# Patient Record
Sex: FEMALE | URBAN - METROPOLITAN AREA
[De-identification: names, ages, dates, MRNs, and addresses within clinical notes are randomized per-mention and may not be internally consistent; named-entity substitution may affect disease eponyms.]

---

## 2019-01-01 ENCOUNTER — TELEPHONE (OUTPATIENT)
Dept: NEUROLOGY | Facility: CLINIC | Age: 59
End: 2019-01-01

## 2019-01-01 ENCOUNTER — DOCUMENTATION ONLY (OUTPATIENT)
Dept: NEUROLOGY | Facility: CLINIC | Age: 59
End: 2019-01-01

## 2019-01-01 ENCOUNTER — OFFICE VISIT (OUTPATIENT)
Dept: NEUROLOGY | Facility: CLINIC | Age: 59
End: 2019-01-01
Payer: COMMERCIAL

## 2019-01-01 ENCOUNTER — HOSPITAL ENCOUNTER (OUTPATIENT)
Facility: CLINIC | Age: 59
End: 2019-01-01
Attending: PSYCHIATRY & NEUROLOGY | Admitting: PSYCHIATRY & NEUROLOGY
Payer: COMMERCIAL

## 2019-01-01 ENCOUNTER — THERAPY VISIT (OUTPATIENT)
Dept: SPEECH THERAPY | Facility: CLINIC | Age: 59
End: 2019-01-01
Payer: COMMERCIAL

## 2019-01-01 ENCOUNTER — OFFICE VISIT (OUTPATIENT)
Dept: PHYSICAL MEDICINE AND REHAB | Facility: CLINIC | Age: 59
End: 2019-01-01
Payer: COMMERCIAL

## 2019-01-01 ENCOUNTER — ALLIED HEALTH/NURSE VISIT (OUTPATIENT)
Dept: NUTRITION | Facility: CLINIC | Age: 59
End: 2019-01-01

## 2019-01-01 ENCOUNTER — PRE VISIT (OUTPATIENT)
Dept: ORTHOPEDICS | Facility: CLINIC | Age: 59
End: 2019-01-01

## 2019-01-01 ENCOUNTER — NURSE TRIAGE (OUTPATIENT)
Dept: NURSING | Facility: CLINIC | Age: 59
End: 2019-01-01

## 2019-01-01 ENCOUNTER — THERAPY VISIT (OUTPATIENT)
Dept: PHYSICAL THERAPY | Facility: CLINIC | Age: 59
End: 2019-01-01
Payer: COMMERCIAL

## 2019-01-01 ENCOUNTER — HOSPITAL ENCOUNTER (INPATIENT)
Facility: CLINIC | Age: 59
LOS: 1 days | Discharge: HOME | End: 2019-05-01
Attending: PSYCHIATRY & NEUROLOGY | Admitting: PSYCHIATRY & NEUROLOGY
Payer: COMMERCIAL

## 2019-01-01 ENCOUNTER — APPOINTMENT (OUTPATIENT)
Dept: INTERVENTIONAL RADIOLOGY/VASCULAR | Facility: CLINIC | Age: 59
End: 2019-01-01
Attending: PSYCHIATRY & NEUROLOGY
Payer: COMMERCIAL

## 2019-01-01 ENCOUNTER — PATIENT OUTREACH (OUTPATIENT)
Dept: CARE COORDINATION | Facility: CLINIC | Age: 59
End: 2019-01-01

## 2019-01-01 ENCOUNTER — MEDICAL CORRESPONDENCE (OUTPATIENT)
Dept: HEALTH INFORMATION MANAGEMENT | Facility: CLINIC | Age: 59
End: 2019-01-01

## 2019-01-01 ENCOUNTER — APPOINTMENT (OUTPATIENT)
Dept: SPEECH THERAPY | Facility: CLINIC | Age: 59
End: 2019-01-01
Attending: STUDENT IN AN ORGANIZED HEALTH CARE EDUCATION/TRAINING PROGRAM
Payer: COMMERCIAL

## 2019-01-01 ENCOUNTER — TELEPHONE (OUTPATIENT)
Dept: INTERVENTIONAL RADIOLOGY/VASCULAR | Facility: CLINIC | Age: 59
End: 2019-01-01

## 2019-01-01 ENCOUNTER — THERAPY VISIT (OUTPATIENT)
Dept: OCCUPATIONAL THERAPY | Facility: CLINIC | Age: 59
End: 2019-01-01
Payer: COMMERCIAL

## 2019-01-01 ENCOUNTER — ANCILLARY PROCEDURE (OUTPATIENT)
Dept: GENERAL RADIOLOGY | Facility: CLINIC | Age: 59
End: 2019-01-01
Payer: COMMERCIAL

## 2019-01-01 ENCOUNTER — TRANSFERRED RECORDS (OUTPATIENT)
Dept: HEALTH INFORMATION MANAGEMENT | Facility: CLINIC | Age: 59
End: 2019-01-01

## 2019-01-01 ENCOUNTER — OFFICE VISIT (OUTPATIENT)
Dept: ORTHOPEDICS | Facility: CLINIC | Age: 59
End: 2019-01-01
Attending: PSYCHIATRY & NEUROLOGY
Payer: COMMERCIAL

## 2019-01-01 ENCOUNTER — DOCUMENTATION ONLY (OUTPATIENT)
Dept: OTHER | Facility: CLINIC | Age: 59
End: 2019-01-01

## 2019-01-01 ENCOUNTER — TELEPHONE (OUTPATIENT)
Dept: NUTRITION | Facility: CLINIC | Age: 59
End: 2019-01-01

## 2019-01-01 ENCOUNTER — DOCUMENTATION ONLY (OUTPATIENT)
Dept: CARE COORDINATION | Facility: CLINIC | Age: 59
End: 2019-01-01

## 2019-01-01 ENCOUNTER — APPOINTMENT (OUTPATIENT)
Dept: EDUCATION SERVICES | Facility: CLINIC | Age: 59
End: 2019-01-01
Payer: COMMERCIAL

## 2019-01-01 ENCOUNTER — APPOINTMENT (OUTPATIENT)
Dept: MEDSURG UNIT | Facility: CLINIC | Age: 59
End: 2019-01-01
Attending: PSYCHIATRY & NEUROLOGY
Payer: COMMERCIAL

## 2019-01-01 ENCOUNTER — HOME INFUSION (PRE-WILLOW HOME INFUSION) (OUTPATIENT)
Dept: PHARMACY | Facility: CLINIC | Age: 59
End: 2019-01-01

## 2019-01-01 VITALS
TEMPERATURE: 97.5 F | WEIGHT: 117 LBS | RESPIRATION RATE: 15 BRPM | DIASTOLIC BLOOD PRESSURE: 84 MMHG | BODY MASS INDEX: 19.49 KG/M2 | HEIGHT: 65 IN | OXYGEN SATURATION: 98 % | HEART RATE: 89 BPM | SYSTOLIC BLOOD PRESSURE: 137 MMHG

## 2019-01-01 VITALS
HEART RATE: 95 BPM | DIASTOLIC BLOOD PRESSURE: 75 MMHG | WEIGHT: 122 LBS | SYSTOLIC BLOOD PRESSURE: 122 MMHG | RESPIRATION RATE: 15 BRPM | HEIGHT: 65 IN | OXYGEN SATURATION: 98 % | BODY MASS INDEX: 20.33 KG/M2

## 2019-01-01 VITALS
BODY MASS INDEX: 20.17 KG/M2 | OXYGEN SATURATION: 95 % | SYSTOLIC BLOOD PRESSURE: 133 MMHG | DIASTOLIC BLOOD PRESSURE: 83 MMHG | HEART RATE: 88 BPM | WEIGHT: 121.2 LBS

## 2019-01-01 VITALS
HEIGHT: 65 IN | WEIGHT: 119 LBS | HEART RATE: 88 BPM | RESPIRATION RATE: 16 BRPM | DIASTOLIC BLOOD PRESSURE: 62 MMHG | SYSTOLIC BLOOD PRESSURE: 112 MMHG | BODY MASS INDEX: 19.83 KG/M2 | TEMPERATURE: 96.4 F | OXYGEN SATURATION: 98 %

## 2019-01-01 VITALS
DIASTOLIC BLOOD PRESSURE: 84 MMHG | RESPIRATION RATE: 15 BRPM | SYSTOLIC BLOOD PRESSURE: 131 MMHG | HEART RATE: 83 BPM | TEMPERATURE: 97.4 F | HEIGHT: 65 IN | WEIGHT: 120 LBS | OXYGEN SATURATION: 96 % | BODY MASS INDEX: 19.99 KG/M2

## 2019-01-01 VITALS
HEART RATE: 72 BPM | DIASTOLIC BLOOD PRESSURE: 67 MMHG | RESPIRATION RATE: 16 BRPM | SYSTOLIC BLOOD PRESSURE: 99 MMHG | WEIGHT: 119.8 LBS | TEMPERATURE: 97.7 F | OXYGEN SATURATION: 100 %

## 2019-01-01 VITALS — HEART RATE: 86 BPM | SYSTOLIC BLOOD PRESSURE: 137 MMHG | RESPIRATION RATE: 16 BRPM | DIASTOLIC BLOOD PRESSURE: 83 MMHG

## 2019-01-01 DIAGNOSIS — G12.21 ALS (AMYOTROPHIC LATERAL SCLEROSIS) (H): ICD-10-CM

## 2019-01-01 DIAGNOSIS — G12.21 ALS (AMYOTROPHIC LATERAL SCLEROSIS) (H): Primary | ICD-10-CM

## 2019-01-01 DIAGNOSIS — R52 PAIN: ICD-10-CM

## 2019-01-01 DIAGNOSIS — G82.50 SPASTIC QUADRIPARESIS (H): Primary | ICD-10-CM

## 2019-01-01 DIAGNOSIS — M25.552 LEFT HIP PAIN: Primary | ICD-10-CM

## 2019-01-01 DIAGNOSIS — R13.12 OROPHARYNGEAL DYSPHAGIA: ICD-10-CM

## 2019-01-01 DIAGNOSIS — M25.562 ARTHRALGIA OF LEFT KNEE: Primary | ICD-10-CM

## 2019-01-01 DIAGNOSIS — Z74.09 IMPAIRED MOBILITY AND ADLS: ICD-10-CM

## 2019-01-01 DIAGNOSIS — Z74.09 IMPAIRED MOBILITY AND ADLS: Primary | ICD-10-CM

## 2019-01-01 DIAGNOSIS — F32.0 MILD MAJOR DEPRESSION (H): Primary | ICD-10-CM

## 2019-01-01 DIAGNOSIS — M25.552 LEFT HIP PAIN: ICD-10-CM

## 2019-01-01 DIAGNOSIS — Z78.9 IMPAIRED MOBILITY AND ADLS: Primary | ICD-10-CM

## 2019-01-01 DIAGNOSIS — I10 BENIGN ESSENTIAL HYPERTENSION: ICD-10-CM

## 2019-01-01 DIAGNOSIS — Z78.9 IMPAIRED MOBILITY AND ADLS: ICD-10-CM

## 2019-01-01 DIAGNOSIS — H04.123 DRY EYES: ICD-10-CM

## 2019-01-01 DIAGNOSIS — M62.838 MUSCLE SPASTICITY: ICD-10-CM

## 2019-01-01 DIAGNOSIS — Z74.1 REQUIRES DAILY ASSISTANCE FOR ACTIVITIES OF DAILY LIVING (ADL) AND COMFORT NEEDS: ICD-10-CM

## 2019-01-01 DIAGNOSIS — G82.50 SPASTIC QUADRIPARESIS (H): ICD-10-CM

## 2019-01-01 DIAGNOSIS — R25.2 SPASTICITY: ICD-10-CM

## 2019-01-01 DIAGNOSIS — R47.1 DYSARTHRIA AND ANARTHRIA: ICD-10-CM

## 2019-01-01 LAB
ALBUMIN SERPL-MCNC: 3 G/DL (ref 3.4–5)
ALBUMIN SERPL-MCNC: 3.7 G/DL (ref 3.4–5)
ALP SERPL-CCNC: 89 U/L (ref 40–150)
ALP SERPL-CCNC: 94 U/L (ref 40–150)
ALT SERPL W P-5'-P-CCNC: 20 U/L (ref 0–50)
ALT SERPL W P-5'-P-CCNC: 21 U/L (ref 0–50)
ANION GAP SERPL CALCULATED.3IONS-SCNC: 6 MMOL/L (ref 3–14)
ANION GAP SERPL CALCULATED.3IONS-SCNC: 6 MMOL/L (ref 3–14)
AST SERPL W P-5'-P-CCNC: 23 U/L (ref 0–45)
AST SERPL W P-5'-P-CCNC: 24 U/L (ref 0–45)
BILIRUB DIRECT SERPL-MCNC: 0.1 MG/DL (ref 0–0.2)
BILIRUB SERPL-MCNC: 0.3 MG/DL (ref 0.2–1.3)
BILIRUB SERPL-MCNC: 0.5 MG/DL (ref 0.2–1.3)
BUN SERPL-MCNC: 15 MG/DL (ref 7–30)
BUN SERPL-MCNC: 17 MG/DL (ref 7–30)
CALCIUM SERPL-MCNC: 8.5 MG/DL (ref 8.5–10.1)
CALCIUM SERPL-MCNC: 8.8 MG/DL (ref 8.5–10.1)
CHLORIDE SERPL-SCNC: 108 MMOL/L (ref 94–109)
CHLORIDE SERPL-SCNC: 111 MMOL/L (ref 94–109)
CO2 SERPL-SCNC: 25 MMOL/L (ref 20–32)
CO2 SERPL-SCNC: 30 MMOL/L (ref 20–32)
CREAT SERPL-MCNC: 0.62 MG/DL (ref 0.52–1.04)
CREAT SERPL-MCNC: 0.68 MG/DL (ref 0.52–1.04)
ERYTHROCYTE [DISTWIDTH] IN BLOOD BY AUTOMATED COUNT: 11.8 % (ref 10–15)
EXPTIME-PRE: 1.08 SEC
EXPTIME-PRE: 3.5 SEC
FEF2575-%PRED-PRE: 107 %
FEF2575-%PRED-PRE: 42 %
FEF2575-PRE: 0.99 L/SEC
FEF2575-PRE: 2.55 L/SEC
FEF2575-PRED: 2.33 L/SEC
FEF2575-PRED: 2.38 L/SEC
FEFMAX-%PRED-PRE: 21 %
FEFMAX-%PRED-PRE: 48 %
FEFMAX-PRE: 1.41 L/SEC
FEFMAX-PRE: 3.21 L/SEC
FEFMAX-PRED: 6.6 L/SEC
FEFMAX-PRED: 6.65 L/SEC
FEV1-%PRED-PRE: 33 %
FEV1-%PRED-PRE: 63 %
FEV1-PRE: 0.83 L
FEV1-PRE: 1.58 L
FEV1FEV6-PRE: 100 %
FEV1FEV6-PRE: 94 %
FEV1FEV6-PRED: 81 %
FEV1FEV6-PRED: 81 %
FEV1FVC-PRE: 100 %
FEV1FVC-PRE: 93 %
FEV1FVC-PRED: 80 %
FEV1FVC-PRED: 81 %
FIFMAX-PRE: 1.6 L/SEC
FIFMAX-PRE: 2.05 L/SEC
FVC-%PRED-PRE: 26 %
FVC-%PRED-PRE: 54 %
FVC-PRE: 0.83 L
FVC-PRE: 1.69 L
FVC-PRED: 3.11 L
FVC-PRED: 3.13 L
GFR SERPL CREATININE-BSD FRML MDRD: >90 ML/MIN/{1.73_M2}
GFR SERPL CREATININE-BSD FRML MDRD: >90 ML/MIN/{1.73_M2}
GLUCOSE SERPL-MCNC: 84 MG/DL (ref 70–99)
GLUCOSE SERPL-MCNC: 87 MG/DL (ref 70–99)
HCT VFR BLD AUTO: 40.2 % (ref 35–47)
HGB BLD-MCNC: 12.7 G/DL (ref 11.7–15.7)
INR PPP: 1.15 (ref 0.86–1.14)
MAGNESIUM SERPL-MCNC: 1.9 MG/DL (ref 1.6–2.3)
MAGNESIUM SERPL-MCNC: 2 MG/DL (ref 1.6–2.3)
MCH RBC QN AUTO: 30.7 PG (ref 26.5–33)
MCHC RBC AUTO-ENTMCNC: 31.6 G/DL (ref 31.5–36.5)
MCV RBC AUTO: 97 FL (ref 78–100)
MEP-PRE: 18 CMH2O
MEP-PRE: 35 CMH2O
MIP-PRE: -14 CMH2O
MIP-PRE: -45 CMH2O
PHOSPHATE SERPL-MCNC: 3.2 MG/DL (ref 2.5–4.5)
PHOSPHATE SERPL-MCNC: 3.7 MG/DL (ref 2.5–4.5)
PLATELET # BLD AUTO: 220 10E9/L (ref 150–450)
POTASSIUM SERPL-SCNC: 3.8 MMOL/L (ref 3.4–5.3)
POTASSIUM SERPL-SCNC: 3.9 MMOL/L (ref 3.4–5.3)
PROT SERPL-MCNC: 6.6 G/DL (ref 6.8–8.8)
PROT SERPL-MCNC: 7.1 G/DL (ref 6.8–8.8)
RBC # BLD AUTO: 4.14 10E12/L (ref 3.8–5.2)
SODIUM SERPL-SCNC: 142 MMOL/L (ref 133–144)
SODIUM SERPL-SCNC: 144 MMOL/L (ref 133–144)
URATE SERPL-MCNC: 3.6 MG/DL (ref 2.6–6)
WBC # BLD AUTO: 5.4 10E9/L (ref 4–11)

## 2019-01-01 PROCEDURE — C1769 GUIDE WIRE: HCPCS

## 2019-01-01 PROCEDURE — 27210886 ZZH ACCESSORY CR5

## 2019-01-01 PROCEDURE — 99153 MOD SED SAME PHYS/QHP EA: CPT

## 2019-01-01 PROCEDURE — 25000132 ZZH RX MED GY IP 250 OP 250 PS 637: Performed by: STUDENT IN AN ORGANIZED HEALTH CARE EDUCATION/TRAINING PROGRAM

## 2019-01-01 PROCEDURE — 27210905 ZZH KIT CR7

## 2019-01-01 PROCEDURE — 40000172 ZZH STATISTIC PROCEDURE PREP ONLY

## 2019-01-01 PROCEDURE — 85610 PROTHROMBIN TIME: CPT | Performed by: PSYCHIATRY & NEUROLOGY

## 2019-01-01 PROCEDURE — 27210429 ZZH NUTRITION PRODUCT INTERMEDIATE LITER

## 2019-01-01 PROCEDURE — 83735 ASSAY OF MAGNESIUM: CPT | Performed by: STUDENT IN AN ORGANIZED HEALTH CARE EDUCATION/TRAINING PROGRAM

## 2019-01-01 PROCEDURE — 99152 MOD SED SAME PHYS/QHP 5/>YRS: CPT

## 2019-01-01 PROCEDURE — 25800030 ZZH RX IP 258 OP 636: Performed by: STUDENT IN AN ORGANIZED HEALTH CARE EDUCATION/TRAINING PROGRAM

## 2019-01-01 PROCEDURE — 25500064 ZZH RX 255 OP 636: Performed by: RADIOLOGY

## 2019-01-01 PROCEDURE — C1887 CATHETER, GUIDING: HCPCS

## 2019-01-01 PROCEDURE — 84100 ASSAY OF PHOSPHORUS: CPT | Performed by: STUDENT IN AN ORGANIZED HEALTH CARE EDUCATION/TRAINING PROGRAM

## 2019-01-01 PROCEDURE — 92526 ORAL FUNCTION THERAPY: CPT | Mod: GN

## 2019-01-01 PROCEDURE — 25000125 ZZHC RX 250: Performed by: RADIOLOGY

## 2019-01-01 PROCEDURE — 80048 BASIC METABOLIC PNL TOTAL CA: CPT | Performed by: STUDENT IN AN ORGANIZED HEALTH CARE EDUCATION/TRAINING PROGRAM

## 2019-01-01 PROCEDURE — 27210732 ZZH ACCESSORY CR1

## 2019-01-01 PROCEDURE — 12000001 ZZH R&B MED SURG/OB UMMC

## 2019-01-01 PROCEDURE — 36415 COLL VENOUS BLD VENIPUNCTURE: CPT | Performed by: PSYCHIATRY & NEUROLOGY

## 2019-01-01 PROCEDURE — 40000141 ZZH STATISTIC PERIPHERAL IV START W/O US GUIDANCE

## 2019-01-01 PROCEDURE — 49440 PLACE GASTROSTOMY TUBE PERC: CPT

## 2019-01-01 PROCEDURE — 36415 COLL VENOUS BLD VENIPUNCTURE: CPT | Performed by: STUDENT IN AN ORGANIZED HEALTH CARE EDUCATION/TRAINING PROGRAM

## 2019-01-01 PROCEDURE — 92610 EVALUATE SWALLOWING FUNCTION: CPT | Mod: GN

## 2019-01-01 PROCEDURE — 27210775 ZZH KIT CR11

## 2019-01-01 PROCEDURE — 0DH63UZ INSERTION OF FEEDING DEVICE INTO STOMACH, PERCUTANEOUS APPROACH: ICD-10-PCS | Performed by: RADIOLOGY

## 2019-01-01 PROCEDURE — 85027 COMPLETE CBC AUTOMATED: CPT | Performed by: PSYCHIATRY & NEUROLOGY

## 2019-01-01 PROCEDURE — 27210916 ZZ H TUBE GASTRO CR5

## 2019-01-01 PROCEDURE — 25000128 H RX IP 250 OP 636: Performed by: RADIOLOGY

## 2019-01-01 PROCEDURE — 80053 COMPREHEN METABOLIC PANEL: CPT | Performed by: STUDENT IN AN ORGANIZED HEALTH CARE EDUCATION/TRAINING PROGRAM

## 2019-01-01 RX ORDER — RILUZOLE 50 MG/1
50 TABLET, FILM COATED ORAL EVERY 12 HOURS
Qty: 60 TABLET | Refills: 3 | Status: ON HOLD | OUTPATIENT
Start: 2019-01-01 | End: 2019-01-01

## 2019-01-01 RX ORDER — ESCITALOPRAM OXALATE 10 MG/1
10 TABLET ORAL DAILY
Status: ON HOLD | COMMUNITY
End: 2019-01-01

## 2019-01-01 RX ORDER — AMOXICILLIN 250 MG
1 CAPSULE ORAL
Status: DISCONTINUED | OUTPATIENT
Start: 2019-01-01 | End: 2019-01-01 | Stop reason: HOSPADM

## 2019-01-01 RX ORDER — FLUMAZENIL 0.1 MG/ML
0.2 INJECTION, SOLUTION INTRAVENOUS
Status: DISCONTINUED | OUTPATIENT
Start: 2019-01-01 | End: 2019-01-01 | Stop reason: HOSPADM

## 2019-01-01 RX ORDER — GABAPENTIN 250 MG/5ML
100 SOLUTION ORAL AT BEDTIME
Status: DISCONTINUED | OUTPATIENT
Start: 2019-01-01 | End: 2019-01-01

## 2019-01-01 RX ORDER — BACLOFEN 10 MG/1
40 TABLET ORAL AT BEDTIME
Status: DISCONTINUED | OUTPATIENT
Start: 2019-01-01 | End: 2019-01-01 | Stop reason: HOSPADM

## 2019-01-01 RX ORDER — DEXTROSE MONOHYDRATE 25 G/50ML
25-50 INJECTION, SOLUTION INTRAVENOUS
Status: DISCONTINUED | OUTPATIENT
Start: 2019-01-01 | End: 2019-01-01 | Stop reason: HOSPADM

## 2019-01-01 RX ORDER — GABAPENTIN 250 MG/5ML
SOLUTION ORAL
Qty: 150 ML | Refills: 3 | Status: SHIPPED | OUTPATIENT
Start: 2019-01-01

## 2019-01-01 RX ORDER — TRAMADOL HYDROCHLORIDE 50 MG/1
TABLET ORAL
Qty: 150 TABLET | Refills: 3 | Status: SHIPPED | OUTPATIENT
Start: 2019-01-01

## 2019-01-01 RX ORDER — SODIUM CHLORIDE 9 MG/ML
INJECTION, SOLUTION INTRAVENOUS CONTINUOUS
Status: ACTIVE | OUTPATIENT
Start: 2019-01-01 | End: 2019-01-01

## 2019-01-01 RX ORDER — POTASSIUM CHLORIDE 1.5 G/1.58G
20-40 POWDER, FOR SOLUTION ORAL
Status: DISCONTINUED | OUTPATIENT
Start: 2019-01-01 | End: 2019-01-01 | Stop reason: HOSPADM

## 2019-01-01 RX ORDER — TRAMADOL HYDROCHLORIDE 50 MG/1
50 TABLET ORAL AT BEDTIME
Refills: 3 | COMMUNITY
Start: 2019-01-01 | End: 2019-01-01

## 2019-01-01 RX ORDER — MAGNESIUM SULFATE HEPTAHYDRATE 40 MG/ML
4 INJECTION, SOLUTION INTRAVENOUS EVERY 4 HOURS PRN
Status: DISCONTINUED | OUTPATIENT
Start: 2019-01-01 | End: 2019-01-01 | Stop reason: HOSPADM

## 2019-01-01 RX ORDER — ESCITALOPRAM OXALATE 10 MG/1
10 TABLET ORAL DAILY
Qty: 30 TABLET | Refills: 1 | Status: SHIPPED | OUTPATIENT
Start: 2019-01-01

## 2019-01-01 RX ORDER — LIDOCAINE 40 MG/G
CREAM TOPICAL
Status: DISCONTINUED | OUTPATIENT
Start: 2019-01-01 | End: 2019-01-01 | Stop reason: HOSPADM

## 2019-01-01 RX ORDER — MELOXICAM 7.5 MG/1
7.5 TABLET ORAL DAILY
Qty: 30 TABLET | Refills: 3 | Status: ON HOLD | OUTPATIENT
Start: 2019-01-01 | End: 2019-01-01

## 2019-01-01 RX ORDER — ACETAMINOPHEN 325 MG/1
325-650 TABLET ORAL
Qty: 30 TABLET | Refills: 0 | Status: SHIPPED | OUTPATIENT
Start: 2019-01-01

## 2019-01-01 RX ORDER — POTASSIUM CHLORIDE 7.45 MG/ML
10 INJECTION INTRAVENOUS
Status: DISCONTINUED | OUTPATIENT
Start: 2019-01-01 | End: 2019-01-01 | Stop reason: HOSPADM

## 2019-01-01 RX ORDER — LOSARTAN POTASSIUM 50 MG/1
50 TABLET ORAL DAILY
Status: ON HOLD | COMMUNITY
End: 2019-01-01

## 2019-01-01 RX ORDER — GLYCOPYRROLATE 1 MG/1
2 TABLET ORAL EVERY 4 HOURS PRN
Status: DISCONTINUED | OUTPATIENT
Start: 2019-01-01 | End: 2019-01-01 | Stop reason: HOSPADM

## 2019-01-01 RX ORDER — BACLOFEN 10 MG/1
TABLET ORAL
Qty: 120 TABLET | Refills: 3 | Status: SHIPPED | OUTPATIENT
Start: 2019-01-01 | End: 2019-01-01

## 2019-01-01 RX ORDER — BACLOFEN 20 MG/1
40 TABLET ORAL 3 TIMES DAILY
Qty: 180 TABLET | Refills: 3 | Status: SHIPPED | OUTPATIENT
Start: 2019-01-01

## 2019-01-01 RX ORDER — BACLOFEN 10 MG/1
30 TABLET ORAL AT BEDTIME
Status: DISCONTINUED | OUTPATIENT
Start: 2019-01-01 | End: 2019-01-01

## 2019-01-01 RX ORDER — TRAMADOL HYDROCHLORIDE 50 MG/1
TABLET ORAL
Qty: 150 TABLET | Refills: 3
Start: 2019-01-01 | End: 2019-01-01

## 2019-01-01 RX ORDER — GLYCOPYRROLATE 1 MG/1
2 TABLET ORAL PRN
Qty: 30 TABLET | Refills: 1 | Status: SHIPPED | OUTPATIENT
Start: 2019-01-01

## 2019-01-01 RX ORDER — SODIUM CHLORIDE 9 MG/ML
INJECTION, SOLUTION INTRAVENOUS CONTINUOUS
Status: DISCONTINUED | OUTPATIENT
Start: 2019-01-01 | End: 2019-01-01 | Stop reason: HOSPADM

## 2019-01-01 RX ORDER — RILUZOLE 50 MG/1
50 TABLET, FILM COATED ORAL EVERY 12 HOURS
Qty: 60 TABLET | Refills: 3 | Status: SHIPPED | OUTPATIENT
Start: 2019-01-01

## 2019-01-01 RX ORDER — BACLOFEN 10 MG/1
TABLET ORAL
Qty: 120 TABLET | Refills: 3 | Status: ON HOLD | OUTPATIENT
Start: 2019-01-01 | End: 2019-01-01

## 2019-01-01 RX ORDER — NALOXONE HYDROCHLORIDE 0.4 MG/ML
.1-.4 INJECTION, SOLUTION INTRAMUSCULAR; INTRAVENOUS; SUBCUTANEOUS
Status: DISCONTINUED | OUTPATIENT
Start: 2019-01-01 | End: 2019-01-01

## 2019-01-01 RX ORDER — CYCLOBENZAPRINE HCL 10 MG
10 TABLET ORAL 2 TIMES DAILY
Status: ON HOLD | COMMUNITY
End: 2019-01-01

## 2019-01-01 RX ORDER — CEFAZOLIN SODIUM 2 G/100ML
2 INJECTION, SOLUTION INTRAVENOUS
Status: CANCELLED | OUTPATIENT
Start: 2019-01-01

## 2019-01-01 RX ORDER — FENTANYL CITRATE 50 UG/ML
25-50 INJECTION, SOLUTION INTRAMUSCULAR; INTRAVENOUS EVERY 5 MIN PRN
Status: DISCONTINUED | OUTPATIENT
Start: 2019-01-01 | End: 2019-01-01 | Stop reason: HOSPADM

## 2019-01-01 RX ORDER — LOSARTAN POTASSIUM 50 MG/1
50 TABLET ORAL DAILY
Qty: 30 TABLET | Refills: 1 | Status: SHIPPED | OUTPATIENT
Start: 2019-01-01

## 2019-01-01 RX ORDER — BACLOFEN 10 MG/1
30 TABLET ORAL 2 TIMES DAILY
Status: DISCONTINUED | OUTPATIENT
Start: 2019-01-01 | End: 2019-01-01 | Stop reason: HOSPADM

## 2019-01-01 RX ORDER — CYCLOBENZAPRINE HCL 10 MG
10 TABLET ORAL 2 TIMES DAILY
Qty: 30 TABLET | Refills: 1 | Status: SHIPPED | OUTPATIENT
Start: 2019-01-01 | End: 2019-01-01

## 2019-01-01 RX ORDER — ESCITALOPRAM OXALATE 10 MG/1
10 TABLET ORAL DAILY
Status: DISCONTINUED | OUTPATIENT
Start: 2019-01-01 | End: 2019-01-01 | Stop reason: HOSPADM

## 2019-01-01 RX ORDER — POTASSIUM CL/LIDO/0.9 % NACL 10MEQ/0.1L
10 INTRAVENOUS SOLUTION, PIGGYBACK (ML) INTRAVENOUS
Status: DISCONTINUED | OUTPATIENT
Start: 2019-01-01 | End: 2019-01-01 | Stop reason: HOSPADM

## 2019-01-01 RX ORDER — TRAMADOL HYDROCHLORIDE 50 MG/1
50 TABLET ORAL AT BEDTIME
Qty: 30 TABLET | Refills: 3 | Status: SHIPPED | OUTPATIENT
Start: 2019-01-01 | End: 2019-01-01

## 2019-01-01 RX ORDER — ALBUTEROL SULFATE 0.63 MG/3ML
1 SOLUTION RESPIRATORY (INHALATION) 3 TIMES DAILY
Qty: 90 VIAL | Refills: 3 | Status: SHIPPED | OUTPATIENT
Start: 2019-01-01

## 2019-01-01 RX ORDER — ONDANSETRON 4 MG/1
4 TABLET, ORALLY DISINTEGRATING ORAL EVERY 6 HOURS PRN
Status: DISCONTINUED | OUTPATIENT
Start: 2019-01-01 | End: 2019-01-01 | Stop reason: HOSPADM

## 2019-01-01 RX ORDER — GLYCOPYRROLATE 1 MG/1
2 TABLET ORAL PRN
Status: ON HOLD | COMMUNITY
End: 2019-01-01

## 2019-01-01 RX ORDER — RILUZOLE 50 MG/1
TABLET, FILM COATED ORAL
Qty: 60 TABLET | Refills: 3 | Status: SHIPPED | OUTPATIENT
Start: 2019-01-01

## 2019-01-01 RX ORDER — RILUZOLE 50 MG/1
50 TABLET, FILM COATED ORAL EVERY 12 HOURS SCHEDULED
Status: DISCONTINUED | OUTPATIENT
Start: 2019-01-01 | End: 2019-01-01 | Stop reason: HOSPADM

## 2019-01-01 RX ORDER — AMOXICILLIN 250 MG
2 CAPSULE ORAL
Status: DISCONTINUED | OUTPATIENT
Start: 2019-01-01 | End: 2019-01-01 | Stop reason: HOSPADM

## 2019-01-01 RX ORDER — LIDOCAINE 40 MG/G
CREAM TOPICAL
Status: CANCELLED | OUTPATIENT
Start: 2019-01-01

## 2019-01-01 RX ORDER — SODIUM CHLORIDE 9 MG/ML
INJECTION, SOLUTION INTRAVENOUS CONTINUOUS
Status: CANCELLED | OUTPATIENT
Start: 2019-01-01

## 2019-01-01 RX ORDER — NALOXONE HYDROCHLORIDE 0.4 MG/ML
.1-.4 INJECTION, SOLUTION INTRAMUSCULAR; INTRAVENOUS; SUBCUTANEOUS
Status: DISCONTINUED | OUTPATIENT
Start: 2019-01-01 | End: 2019-01-01 | Stop reason: HOSPADM

## 2019-01-01 RX ORDER — TRAMADOL HYDROCHLORIDE 50 MG/1
50 TABLET ORAL AT BEDTIME
Status: DISCONTINUED | OUTPATIENT
Start: 2019-01-01 | End: 2019-01-01

## 2019-01-01 RX ORDER — NICOTINE POLACRILEX 4 MG
15-30 LOZENGE BUCCAL
Status: DISCONTINUED | OUTPATIENT
Start: 2019-01-01 | End: 2019-01-01 | Stop reason: HOSPADM

## 2019-01-01 RX ORDER — TRAMADOL HYDROCHLORIDE 50 MG/1
TABLET ORAL
Qty: 60 TABLET | Refills: 3
Start: 2019-01-01 | End: 2019-01-01

## 2019-01-01 RX ORDER — ONDANSETRON 2 MG/ML
4 INJECTION INTRAMUSCULAR; INTRAVENOUS EVERY 6 HOURS PRN
Status: DISCONTINUED | OUTPATIENT
Start: 2019-01-01 | End: 2019-01-01 | Stop reason: HOSPADM

## 2019-01-01 RX ORDER — LOSARTAN POTASSIUM 50 MG/1
50 TABLET ORAL DAILY
Status: DISCONTINUED | OUTPATIENT
Start: 2019-01-01 | End: 2019-01-01 | Stop reason: HOSPADM

## 2019-01-01 RX ORDER — HYDROMORPHONE HCL/0.9% NACL/PF 0.2MG/0.2
0.2 SYRINGE (ML) INTRAVENOUS EVERY 4 HOURS PRN
Status: DISCONTINUED | OUTPATIENT
Start: 2019-01-01 | End: 2019-01-01 | Stop reason: HOSPADM

## 2019-01-01 RX ORDER — CYCLOBENZAPRINE HCL 10 MG
10 TABLET ORAL 2 TIMES DAILY
Status: DISCONTINUED | OUTPATIENT
Start: 2019-01-01 | End: 2019-01-01

## 2019-01-01 RX ORDER — BACLOFEN 10 MG/1
20 TABLET ORAL 2 TIMES DAILY
Status: DISCONTINUED | OUTPATIENT
Start: 2019-01-01 | End: 2019-01-01

## 2019-01-01 RX ORDER — IODIXANOL 320 MG/ML
50 INJECTION, SOLUTION INTRAVASCULAR ONCE
Status: COMPLETED | OUTPATIENT
Start: 2019-01-01 | End: 2019-01-01

## 2019-01-01 RX ORDER — POTASSIUM CHLORIDE 750 MG/1
20-40 TABLET, EXTENDED RELEASE ORAL
Status: DISCONTINUED | OUTPATIENT
Start: 2019-01-01 | End: 2019-01-01 | Stop reason: HOSPADM

## 2019-01-01 RX ORDER — POTASSIUM CHLORIDE 29.8 MG/ML
20 INJECTION INTRAVENOUS
Status: DISCONTINUED | OUTPATIENT
Start: 2019-01-01 | End: 2019-01-01 | Stop reason: HOSPADM

## 2019-01-01 RX ADMIN — MULTIVITAMIN 15 ML: LIQUID ORAL at 07:52

## 2019-01-01 RX ADMIN — SODIUM CHLORIDE: 9 INJECTION, SOLUTION INTRAVENOUS at 14:48

## 2019-01-01 RX ADMIN — SODIUM CHLORIDE: 9 INJECTION, SOLUTION INTRAVENOUS at 11:14

## 2019-01-01 RX ADMIN — IODIXANOL 15 ML: 320 INJECTION, SOLUTION INTRAVASCULAR at 11:56

## 2019-01-01 RX ADMIN — RILUZOLE 50 MG: 50 TABLET, FILM COATED ORAL at 07:53

## 2019-01-01 RX ADMIN — DEXTROMETHORPHAN HYDROBROMIDE AND QUINIDINE SULFATE 1 CAPSULE: 20; 10 CAPSULE, GELATIN COATED ORAL at 00:58

## 2019-01-01 RX ADMIN — LIDOCAINE HYDROCHLORIDE 10 ML: 10 INJECTION, SOLUTION EPIDURAL; INFILTRATION; INTRACAUDAL; PERINEURAL at 12:03

## 2019-01-01 RX ADMIN — BACLOFEN 30 MG: 10 TABLET ORAL at 14:13

## 2019-01-01 RX ADMIN — CYCLOBENZAPRINE HYDROCHLORIDE 10 MG: 10 TABLET, FILM COATED ORAL at 07:53

## 2019-01-01 RX ADMIN — LOSARTAN POTASSIUM 50 MG: 50 TABLET ORAL at 07:53

## 2019-01-01 RX ADMIN — RILUZOLE 50 MG: 50 TABLET, FILM COATED ORAL at 00:58

## 2019-01-01 RX ADMIN — BACLOFEN 30 MG: 10 TABLET ORAL at 22:38

## 2019-01-01 RX ADMIN — ACETAMINOPHEN 650 MG: 325 SOLUTION ORAL at 11:08

## 2019-01-01 RX ADMIN — DEXTROMETHORPHAN HYDROBROMIDE AND QUINIDINE SULFATE 1 CAPSULE: 20; 10 CAPSULE, GELATIN COATED ORAL at 07:53

## 2019-01-01 RX ADMIN — SODIUM CHLORIDE: 9 INJECTION, SOLUTION INTRAVENOUS at 04:14

## 2019-01-01 RX ADMIN — BACLOFEN 20 MG: 10 TABLET ORAL at 07:52

## 2019-01-01 RX ADMIN — ESCITALOPRAM OXALATE 10 MG: 10 TABLET ORAL at 07:52

## 2019-01-01 RX ADMIN — TRAMADOL HYDROCHLORIDE 50 MG: 50 TABLET, COATED ORAL at 21:13

## 2019-01-01 RX ADMIN — CYCLOBENZAPRINE HYDROCHLORIDE 10 MG: 10 TABLET, FILM COATED ORAL at 22:37

## 2019-01-01 RX ADMIN — MULTIVITAMIN 15 ML: LIQUID ORAL at 17:25

## 2019-01-01 RX ADMIN — GLUCAGON HYDROCHLORIDE 1 MG: 1 INJECTION, POWDER, FOR SOLUTION INTRAMUSCULAR; INTRAVENOUS; SUBCUTANEOUS at 11:56

## 2019-01-01 RX ADMIN — FENTANYL CITRATE 100 MCG: 50 INJECTION INTRAMUSCULAR; INTRAVENOUS at 11:56

## 2019-01-01 RX ADMIN — MIDAZOLAM HYDROCHLORIDE 2 MG: 1 INJECTION, SOLUTION INTRAMUSCULAR; INTRAVENOUS at 11:56

## 2019-01-01 SDOH — HEALTH STABILITY: MENTAL HEALTH: HOW OFTEN DO YOU HAVE A DRINK CONTAINING ALCOHOL?: NEVER

## 2019-01-01 ASSESSMENT — REVISED AMYOTROPHIC LATERAL SCLEROSIS FUNCTIONAL RATING SCALE (ALSFRS-R)
DRESSING_AND_HYGEINE: 0
SPEECH: 0
SPEECH: LOSS OF USEFUL SPEECH
CLIMBING_STAIRS: CANNOT DO
ORTHOPENA: CAN ONLY SLEEP SITTING UP
ALSFRS_TOTAL_SCORE: 8
TURNING_IN_BED_AND_ADJUSTING_BED_CLOTHES: 0
SWALLOWING: NEEDS SUPPLEMENTAL TUBE FEEDING
SIX_ITEM_SUBTOTAL: 4
SALIVATION: MARKED DROOLING; REQUIRES CONSTANT TISSUE OR HANDKERCHIEF
HANDWRITING: UNABLE TO GRIP PEN
GROSS_MOTOR_SUBTOTAL_SCORE: 0
WALKING: NO PURPOSEFUL LEG MOVEMENT
DRESSING_AND_HYGEINE: TOTAL DEPENDENCE
CUTTING_FOOD_AND_HANDLING_UTENSILES: 0
WALKING: 0
BULBAR_SUBTOTAL: 1
CLIMBING_STAIRS: 0
HANDWRITING: 0
SALIVATION: 0
ORTHOPENA: 1
DYSPNEA: 4
RESPIRATORY_SUBTOTAL_SCORE: 7
SWALLOWING: 1
RESPIRATORY_INSUFFICIENCY: CONTINUOUS USE OF NIPPV DURING THE NIGHT
TURNING_IN_BED_AND_ADJUSTING_BED_CLOTHES: HELPLESS
RESPIRATORY_INSUFFICIENCY: 2
FINE_MOTOR_SUBTOTAL_SCORE: 0

## 2019-01-01 ASSESSMENT — ENCOUNTER SYMPTOMS
TREMORS: 0
MUSCLE CRAMPS: 1
ARTHRALGIAS: 1
LOSS OF CONSCIOUSNESS: 0
MYALGIAS: 1
DIZZINESS: 1
INSOMNIA: 1
STIFFNESS: 1
DECREASED CONCENTRATION: 0
DEPRESSION: 1
HEADACHES: 0
DISTURBANCES IN COORDINATION: 1
PANIC: 0
TINGLING: 0
NERVOUS/ANXIOUS: 0
JOINT SWELLING: 0
NECK PAIN: 0
SEIZURES: 0
MEMORY LOSS: 0
WEAKNESS: 1
SPEECH CHANGE: 1
BACK PAIN: 0
MUSCLE WEAKNESS: 1

## 2019-01-01 ASSESSMENT — PAIN SCALES - GENERAL
PAINLEVEL: MODERATE PAIN (5)
PAINLEVEL: SEVERE PAIN (6)
PAINLEVEL: MODERATE PAIN (5)

## 2019-01-01 ASSESSMENT — MIFFLIN-ST. JEOR
SCORE: 1116.59
SCORE: 1130.66
SCORE: 1130.2
SCORE: 1139.27

## 2019-01-01 ASSESSMENT — VISUAL ACUITY
OU: BASELINE;GLASSES
OU: NORMAL ACUITY;BASELINE;GLASSES

## 2019-01-01 ASSESSMENT — ACTIVITIES OF DAILY LIVING (ADL)
ADLS_ACUITY_SCORE: 35
ADLS_ACUITY_SCORE: 35
ADLS_ACUITY_SCORE: 31
ADLS_ACUITY_SCORE: 35
ADLS_ACUITY_SCORE: 28

## 2019-04-11 NOTE — PROGRESS NOTES
ALS Social Work Assessment  Collaborated with: Blanka, spouse Parker, Dr Allan and members of the ALS interdisciplinary team  Support System: Parker and her dtr Johanna who lives in [facility], Parker's Dtr Madeline who lives in [place]. Parker has 2 other children. They have 8 grandchildren between the 2 of them. Blanka also has 1 living sister. Her 4 other siblings have , 2 from ALS, 1 from [place] and 1 suicide. Lavell's Mother  from ALS at age 42. Her Father  when Blanka was 13. Blanka was raised after that by her older sister.   They have not discussed the genetic form of ALS that she has with her dtr.  Living Situation: They are currently living in a hotel in [place]. They had been living in [place] for several years. They purchased an RV and were traveling and now they plan to make [place] their home. Parker has been looking for affordable housing in the [facility] but has not found anything yet. They have been in [place] about a week. They plan to rent an accessible apartment.  Functional Capacity: Lavell is dependent with most of her ADLs, IADLs. A Gtube is planned. She is WC dependent and has lost her speech. She tried to spell on an IPad but was not successful.   Primary Caregiver: Parker. They have been together 25 years.  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   Financial stability/insurance: they are living on [place] social security income and some savings/senior care. They sought some legal help re denial of SSDI and SSI . They pay for insurance for Blanka thru [place] insurance exchange. Premium is about $200./month and there are deductibles and copays. Discussed applying for [place] for [place] when they have an address and have lived in [place] at least 30 days. [place].   Agencies involved:  Registering with ALS  Cultural/ethnic/spiritual: no Adventist/spiritual connections  Mental Health/CD/Cognitive concerns: Pt has had long term depression that she indicates has been stable on medication. The PBA is improved since medication was started a month ago.   Coping style/adjustment to ALS: Pt didn't really answer my question about how she ardha with this when I asked her. She looks to her husb to speak for her and he didn't comment. Validated that ALS is probably something she worried about getting based upon her family history.  Legal concerns: They haven't done any estate planning. We discussed that in light of possibly applying for [place]-CADI to get services at home in the future. Provided info re estate planners.  Advanced Care Planning: deferred  Goals/Strengths: Pt has good support with Parker. Unsure about other supports.   Resource Needs: Home care was recommended by PT/OT. Discussed that and referral will be made to  home care to see them at their hotel to get evaluations tripp for DME. Pt also is being referred for bipap and gtube.  Provided info for [facility] an agency that finds housing for seniors for no charge. They want to live in the [facility]. We also discussed metro mobility and mnsure will start an application when they have a new address. They will also access [facility]'s respite care program.   Education Provided: metro mobility, ALSA, SSDI, mnsure, housing, home care, services at home thru waiver  Intervention/Assessment: Provided information and support today and opportunity to learn more about who they are and their backgrounds.   Plan: Encouraged them to contact me as needed between visits. Future applications for Metro mobility and possibly cadi waiver. They indicated I could email the [employer] pro mariola  about her social security situation to see if there is anything they can do.    Blanka Armas, MSW, [facility]    [facility]  [place]  [phone]

## 2019-04-11 NOTE — LETTER
4/11/2019       RE: Blanka Morton  [address]  [place] [address]     Dear Colleague,    Thank you for referring your patient, Blanka Morton, to the [facility] NEUROLOGY at [facility]. Please see a copy of my visit note below.    [facility] ALS Certified [facility] Excellence  04/11/19       Referral:     Chief Complaint: Motor neuron disease    History of Present Illness:      Mrs. Blanka Morton is a 58 year old woman with a history of progressive dysarthria, dysphagia, left>right bilateral limb weakness with a known [id number] mutation and family history of ALS who presents for evaluation of motor neuron disease. History obtained from her  David. In June 2017 she began to notice changes in her speech. October 2018, she tripped on her left foot while at a vacation site. Over time, her left leg became weaker. Left hand then arm weakness began around November 2018. Within the last month, the right hand has become. The right leg is not weak. Up until 4 weeks ago, she was able to walk with some assistance; she now constantly uses a wheelchair. November 2018 she became completely anarthric and dysphagia began with solids and later with water. Currently she's eats soft and pureed foods, (omlettes, smoothies) and thickened liquids. She has no choking or coughing. She started Glycopyrralate 1 month ago and currently takes 1 tab in the morning, 2 tab in the afternoon, 1 in the evening for sialorrhea which is helpful. Nudexta was also started 1 month as well; it has been effective in improving her sudden laughing and crying.     Denies any dyspnea or orthopnea. Pain keeps her up at times at night; if she doesn't have pain, she'll sleep 7 hours. She does not nap during the day, snore, have apneic episodes and feels refreshed in the morning. She has had stable weight over the past month. She intentionally lost 40 pounds over the past 2 years. She has a lot of left hip and leg pain due  to stiffness. She will have difficulty straightening her leg at night, which causes a lot of pain. She does not have cramps. She does have fasciculations. She has been more agitated and frustrated at times during appropriate settings.  Denies any eye dryness. Of note, she did not have an EMG. She did have an MRI of her brain but not of the spine.     Prior pertinent radiologic/laboratory work-up:  [facility] Panel- ALS genetic panel  Gene Dx: one expanded  allelle in [id number]   (original reports unavailable; per record notes)  12/13/2018  MRI brain w/o contrast- Small vessel ischemic changes  1/9/2019 Swallow study- recommended thickened liquids and soft/pureed food    Past Medical History:   ALS- C9orf mutation  Depression, HTN  Seizures (2- never diagnosed with epilepsy. In the setting of low sodium in setting)    Family history:    The following family members were all were diagnosed with ALS and passed away. It is unknown if any family member underwent genetic  Mother 41, Maternal aunt 69   Brother Wilber Rodriguez 60  Brother Rodriguez 52  She has one biological daughter Tala who is 29.     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 No alcohol use. Smoked in the past.   She lives with her  David; they do not have residence in [place] but are looking to move back. The travel down via  to [place] during the winter. Not a .     Medical Allergies:  No Known Allergies     Current Medications:   Nudexta 20-10mg Q12  Glycopyrralate  Cylcobenzaprine   Riluzole 50mg BID  Lexapro   Benicar    Review of Systems: A complete review of systems was obtained and was negative except for what was noted above.    Physical examination:    BP 99/67 (BP Location: Right arm, Patient Position: Sitting, Cuff Size: Adult Regular)   Pulse 72   Temp 97.7  F (36.5  C) (Oral)   Resp 16   Wt 54.3 kg (119 lb 12.8 oz)   SpO2 100%      PFT Latest Ref Rng & Units 4/11/2019   FVC L 1.69    FEV1 L 1.58   FVC% % 54   FEV1% % 63   MIP: -45    General Appearance: NAD    Skin: There are no rashes or other skin lesions.    Musculoskeletal:  There is no scoliosis, lordosis, kyphosis, pes cavus, or hammertoes.    Neurologic examination:    Mental status:  Patient is alert, attentive, nods appropriately to questions  Anarthric. Able to follow commands.     Cranial nerves:  Pupils were round and reacted to light.  Extraocular movements were full.   Severely weak eye and lip closure, palate elevation, tongue movement. Tongue atrophy and fasciculations. Mild jaw opening weakness.    Hearing was grossly intact.  Shoulder shrug was normal.    Motor exam: atrophy . Increased tone left >right upper and lower limbs     Right Left   Neck flexion 4+    Neck extension: 5    Shoulder abduction:  4 4   Elbow extension: 4+ 4+   Elbow flexion:  4+ 4+   Wrist flexion:  4- 3   Wrist extension:  4 3   Finger flexion 4 3   Finger extension 4- 2     FDI 1 0   APB 2 1   Hip flexion 4 4   Hip abduction 2 2   Hip adduction 5 5   Knee flexion 5 5   Knee extension 3- 2   Dorsiflexion 4 3   Plantar flexion 5 4     Unable to test Finger-nose- finger and heel to shin were intact.       Sensory exam revealed normal perception of vibration, proprioception, light touch, pin, and temperature proximally and distally in the arms and legs bilaterally. Romberg sign was absent      Deep tendon reflexes:   Right Left   Triceps 3 3   Biceps 3 3   Brachioradialis 3 3   Knee jerk 3 3   Ankle jerk 1 0   Plantar responses were flexor bilaterally.     + jaw jerk  +B/l pectoralis reflexes, cross addctors, hoffmans    Assessment:    Mrs. Blanka Morton is a 58 year old woman with a history of progressive dysarthria, dysphagia, left>right bilateral limb weakness, a known pathogenic [id number] mutation and strong family history of ALS who presents for evaluation of motor neuron disease. Physical exam showed UMN and LMN signs in the bulbar, cervical and lumbar  regions fulfilling [place] Criteria for clinically definite ALS. Given today's PFT values, she meets criteria for BiPAP and will also need cough assist. We will refer her for gastrostomy tube placement given dysphagia on a previous swallow study. After discussing possible side effects, the patient is agreeable to starting Baclofen for painful spasticity that often disturbs her sleep; she will discontinue Cyclobenzaprine as it has been ineffective. Sialorrhea has improved after starting Glycopyrrelate and pseudobulbar improved with Nudexta. We briefly discussed the diagnosis, prognosis and available research studies during today's visit. He will meet with all members of our multidisciplinary team today and follow up in 3 months.     Plan:     - Stop Cyclobenzaprine  - Start Baclofen 10 mg TID for 1 week then increase by 10 mg at bedtime, then reassess  - Continue Glycopyrrelate 1-2mg TID  - Continue Nuedexta 20-10mg Q12  - Start Riluzole 50mg BID  - Gastrostomy tube placement  - NIV and cough assist    I spent 60 min in face to face time with the patient. More than 50% of the time was spent in patient education and coordination of care.     Laura Correa DO  [facility] Neuromuscular Fellow [id number]    I personally examined the patient and concur with the resident's note. I reviewed the natural history of ALS and their plans for the future; she reports some interest in considering mechanical ventilation at a later date and I encouraged more discussion. I also discussed the potential for cognitive and behavioral dysfunction.    After discharge I noted ECAS scores from [facility]. It appears she demonstrated mild to moderate memory dysfunction, although her ALS-specific and total ECAS scores fell within the normal range.     Finally we did discuss clinical trials. She is not a candidate for most interventional trials but she did enroll in the ALS Natural History study. I explained the ASO trial for  C9-related ALS and we discussed the logistics of participation; unfortunately currently the focus of her management must be on aggressive supportive care, but I emphasized that it is important that her family be aware of developments for this form of ALS and FTD.    See patient instructions as well for final recommendations.        Again, thank you for allowing me to participate in the care of your patient.      Sincerely,    Lew Allan MD

## 2019-04-11 NOTE — PATIENT INSTRUCTIONS
"1. Start baclofen as prescribed for muscle stiffness. Can cause drowsiness or weakness but I believe it will help you.  2. Start Mobic instead of advil. I will send in the prescription.   3. Start riluzole 1 week after starting baclofen.  4. We will schedule feeding tube placement. Gia will contact you regarding the details. Take a look at the \"Living with a Feeding (PEG) Tube\" video on the alshf.org website.  5. We will arrange home care as well so that therapists can work with you between clinic visits.  6. Start NIV (\"BiPAP\") and cough assist. You will be contacted by [facility] for set up.  7. Review our advanced directive form. It is important to complete this.     For non-urgent questions, concerns or scheduling issues call Gia @ [phone] or the general neurology clinic line @ [phone]. We will make every attempt to return your call within 24 hours, during regular business hours.    Emergencies should be directed to the nearest Emergency Room or 911.      "

## 2019-04-11 NOTE — PROGRESS NOTES
OUTPATIENT PHYSICAL THERAPY CLINIC NOTE  Blanka Morton     YOB: 1960  [id number]    Type of visit:         Evaluation     Date of service: 4/11/2019    Referring provider: Dr. Allan     Others present at visit:  Spouse / significant otherDavid    Medical diagnosis:   Amyotrophic lateral sclerosis (ALS)    Date of diagnosis: 2018 In [place]    Pertinent history of current problem (include personal factors and/or comorbidities that impact the plan of care): Onset 6/2017 of slow speech and dysphagia. Oct 2018 onset of limb weakness, L LE and L hand, then progressing to R hand. Nov 2018 progression to anarthria. Pt has C9orf mutation. Pt has h/o seizures, HTN, low back pain, depression and anxiety. Family hx of ALS- mother, 2 brothers, and an aunt.    Cardio-respiratory status:  Forced vital capacity: 54 % of predicted   BiPAP- prescribed today    Height/Weight: NT / 119 lb    Living environment:  [facility], with spouse  [place]    Living environment barriers:  Elevator access at \[facility]\""  Looking for accessible housing in area.     Current assistance/living environment:  Lives with spouseDavid      Current mobility equipment:  Manual WC with foam cushion  *Will request PWC eval via home care  *[place] lift requested today- spouse notes they may wait until moved  *Roho to be requested today  *Transfer belt requested today     Current ADL equipment:  [facility] has roll in shower and grab bars at \[facility]\"" (has been using via manual WC)  *Wheeled shower commode chair requested today    Technology used: Uses phone to text with spouse for communication    Patient concerns/goals: Moved here from [place], were staying in a mobile home, currently staying in a hotel in [place]. Family is here in [facility]. They are looking for accessible housing. Pt's mother, 2 brothers and an aunt had ALS and have passed away.     Evaluation   Interview completed.   Pain assessment:  Pain present: L shoulder, hip and  knee- from muscle spasms   Range of motion: HS stiffness R, more contracted L, adducted at rest, ankles to neutral    Manual muscle testing: Hip flexion 4+/5 R, 4/5 L, hip abd 4/5 R, 4-/5 L, hip add 5/5 B, knee ext 5/5 R, 2+/5 L, knee flex 4+/5 R, 4/5 L, ankle DF 5/5 R, 2/5 L   Gait: Non-ambulatory. Pt spouse has been lifting her for transfers.    Cognition: See OT/SLP notes for detail, pt is anarthric but engaged in conversation.    Fall Risk Screen:   Has the patient fallen 2 or more times in the last year? No      Has the patient fallen and had an injury in the past year? No       Timed Up and Go Score: Not able to perform due to non-ambulatory    Is the patient a fall risk? Yes, department fall risk interventions implemented     Impairments:  Fatigue  Muscle atrophy  Coordination  Balance  Pain  Range of motion  Spasticity     Treatment diagnosis:  Impaired mobility  Impaired activities of daily living    Clinical Presentation: Evolving/Changing  Clinical Presentation Rationale: Progressive nature of disease with all 4 extremities and bulbar region involved, requiring extensive cares from her spouse.  Clinical Decision Making (Complexity): Moderate complexity     Recommendations/Plan of care:  1 session evaluation & treatment.  Recommend referral to Home PT/OT/SLP eval/treat including PROM/stretching program, safety of transfers and equipment recommendations/instruction with equipment recommended today.  Equipment recommended: Roho cushion for manual WC, PWC eval via home care, transfer belt, lane lift- may wait until they've moved, hospital bed- will consider for after moved.      Goals:   Target date: 4/11/19  Patient, family and/or caregiver will verbalize understanding of evaluation results and implications for functional performance.  Patient, family and/or caregiver will verbalize/demonstrate understanding of compensatory methods /equipment to enhance functional independence and safety.  Patient, family  and/or caregiver will verbalize/demonstrate understanding of home program.    Educational assessment/barriers to learning:   No barriers noted     Treatment provided this date:   Therapeutic exercise, 8 minutes  -Instruction provided to pt/spouse for PROM/stretching program to manage B LE spasticity and pain, in addition to new Rx for baclofen today. Instructed in hip/knee flexion, HS stretch lying and seated, adductor stretch, BKFO, quad stretch and calf stretching with caregiver assist. Education for slow gradual movements toward achievable PROM for end range stretching, demonstrated with seated HS and calf stretching.     Therapeutic activities, 8 minutes  -Discussed safety of transfers, as pt spouse notes they likely don't have space for lane lift use in hotel, will likely wait until moved. Education provided for how to don transfer belt, low and snug. Hand placement and body positioning of caregiver for pivot transfers, including wheelchair positioning to reduce distance needed to pivot. Demonstration provided, pt's spouse notes understanding.   -Education for use of PWC seating components for making position changes for comfort and pressure relief, as well as tool for some stretches- HS and quads  -Education for use of bed adjustments for comfort during the night and ease of supine<>sit transfers to reduce caregiver burden    Response to treatment/recommendations: Pt spouse note understanding    Goal attainment:  All goals met     Risks and benefits of evaluation/treatment have been explained.  Patient, family and/or caregiver are in agreement with Plan of Care.     Timed Code Treatment Minutes: 16  Total Treatment Time (sum of timed and untimed services): 20    Signature: Franny Kenyon, PT   Date: 4/11/2019

## 2019-04-11 NOTE — PROGRESS NOTES
[facility] ALS Certified Center Excellence  04/11/19       Referral:     Chief Complaint: Motor neuron disease    History of Present Illness:      Mrs. Blanka Morton is a 58 year old woman with a history of progressive dysarthria, dysphagia, left>right bilateral limb weakness with a known [id number] mutation and family history of ALS who presents for evaluation of motor neuron disease. History obtained from her  David. In June 2017 she began to notice changes in her speech. October 2018, she tripped on her left foot while at a vacation site. Over time, her left leg became weaker. Left hand then arm weakness began around November 2018. Within the last month, the right hand has become. The right leg is not weak. Up until 4 weeks ago, she was able to walk with some assistance; she now constantly uses a wheelchair. November 2018 she became completely anarthric and dysphagia began with solids and later with water. Currently she's eats soft and pureed foods, (omlettes, smoothies) and thickened liquids. She has no choking or coughing. She started Glycopyrralate 1 month ago and currently takes 1 tab in the morning, 2 tab in the afternoon, 1 in the evening for sialorrhea which is helpful. Nudexta was also started 1 month as well; it has been effective in improving her sudden laughing and crying.     Denies any dyspnea or orthopnea. Pain keeps her up at times at night; if she doesn't have pain, she'll sleep 7 hours. She does not nap during the day, snore, have apneic episodes and feels refreshed in the morning. She has had stable weight over the past month. She intentionally lost 40 pounds over the past 2 years. She has a lot of left hip and leg pain due to stiffness. She will have difficulty straightening her leg at night, which causes a lot of pain. She does not have cramps. She does have fasciculations. She has been more agitated and frustrated at times during appropriate settings.  Denies any eye  dryness. Of note, she did not have an EMG. She did have an MRI of her brain but not of the spine.     Prior pertinent radiologic/laboratory work-up:  [facility] Panel- ALS genetic panel  Gene Dx: one expanded  allelle in [id number]   (original reports unavailable; per record notes)  12/13/2018  MRI brain w/o contrast- Small vessel ischemic changes  1/9/2019 Swallow study- recommended thickened liquids and soft/pureed food    Past Medical History:   ALS- C9orf mutation  Depression, HTN  Seizures (2- never diagnosed with epilepsy. In the setting of low sodium in setting)    Family history:    The following family members were all were diagnosed with ALS and passed away. It is unknown if any family member underwent genetic  Mother 41, Maternal aunt 69   Brother Wilber Rodriguez 60  Brother Rodriguez 52  She has one biological daughter Tala who is 29.     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 No alcohol use. Smoked in the past.   She lives with her  David; they do not have residence in [place] but are looking to move back. The travel down via  to [place] during the winter. Not a .     Medical Allergies:  No Known Allergies     Current Medications:   Nudexta 20-10mg Q12  Glycopyrralate  Cylcobenzaprine   Riluzole 50mg BID  Lexapro   Benicar    Review of Systems: A complete review of systems was obtained and was negative except for what was noted above.    Physical examination:    BP 99/67 (BP Location: Right arm, Patient Position: Sitting, Cuff Size: Adult Regular)   Pulse 72   Temp 97.7  F (36.5  C) (Oral)   Resp 16   Wt 54.3 kg (119 lb 12.8 oz)   SpO2 100%      PFT Latest Ref Rng & Units 4/11/2019   FVC L 1.69   FEV1 L 1.58   FVC% % 54   FEV1% % 63   MIP: -45    General Appearance: NAD    Skin: There are no rashes or other skin lesions.    Musculoskeletal:  There is no scoliosis, lordosis, kyphosis, pes cavus, or hammertoes.    Neurologic  examination:    Mental status:  Patient is alert, attentive, nods appropriately to questions  Anarthric. Able to follow commands.     Cranial nerves:  Pupils were round and reacted to light.  Extraocular movements were full.   Severely weak eye and lip closure, palate elevation, tongue movement. Tongue atrophy and fasciculations. Mild jaw opening weakness.    Hearing was grossly intact.  Shoulder shrug was normal.    Motor exam: atrophy . Increased tone left >right upper and lower limbs     Right Left   Neck flexion 4+    Neck extension: 5    Shoulder abduction:  4 4   Elbow extension: 4+ 4+   Elbow flexion:  4+ 4+   Wrist flexion:  4- 3   Wrist extension:  4 3   Finger flexion 4 3   Finger extension 4- 2     FDI 1 0   APB 2 1   Hip flexion 4 4   Hip abduction 2 2   Hip adduction 5 5   Knee flexion 5 5   Knee extension 3- 2   Dorsiflexion 4 3   Plantar flexion 5 4     Unable to test Finger-nose- finger and heel to shin were intact.       Sensory exam revealed normal perception of vibration, proprioception, light touch, pin, and temperature proximally and distally in the arms and legs bilaterally. Romberg sign was absent      Deep tendon reflexes:   Right Left   Triceps 3 3   Biceps 3 3   Brachioradialis 3 3   Knee jerk 3 3   Ankle jerk 1 0   Plantar responses were flexor bilaterally.     + jaw jerk  +B/l pectoralis reflexes, cross addctors, hoffmans    Assessment:    Mrs. Blanka Morton is a 58 year old woman with a history of progressive dysarthria, dysphagia, left>right bilateral limb weakness, a known pathogenic [id number] mutation and strong family history of ALS who presents for evaluation of motor neuron disease. Physical exam showed UMN and LMN signs in the bulbar, cervical and lumbar regions fulfilling [place] Criteria for clinically definite ALS. Given today's PFT values, she meets criteria for BiPAP and will also need cough assist. We will refer her for gastrostomy tube placement given dysphagia on a previous  swallow study. After discussing possible side effects, the patient is agreeable to starting Baclofen for painful spasticity that often disturbs her sleep; she will discontinue Cyclobenzaprine as it has been ineffective. Sialorrhea has improved after starting Glycopyrrelate and pseudobulbar improved with Nudexta. We briefly discussed the diagnosis, prognosis and available research studies during today's visit. He will meet with all members of our multidisciplinary team today and follow up in 3 months.     Plan:     - Stop Cyclobenzaprine  - Start Baclofen 10 mg TID for 1 week then increase by 10 mg at bedtime, then reassess  - Continue Glycopyrrelate 1-2mg TID  - Continue Nuedexta 20-10mg Q12  - Start Riluzole 50mg BID  - Gastrostomy tube placement  - NIV and cough assist    I spent 60 min in face to face time with the patient. More than 50% of the time was spent in patient education and coordination of care.     Laura Correa DO  [facility] Neuromuscular Fellow [id number]    I personally examined the patient and concur with the resident's note. I reviewed the natural history of ALS and their plans for the future; she reports some interest in considering mechanical ventilation at a later date and I encouraged more discussion. I also discussed the potential for cognitive and behavioral dysfunction.    After discharge I noted ECAS scores from [facility]. It appears she demonstrated mild to moderate memory dysfunction, although her ALS-specific and total ECAS scores fell within the normal range.     Finally we did discuss clinical trials. She is not a candidate for most interventional trials but she did enroll in the ALS Natural History study. I explained the ASO trial for C9-related ALS and we discussed the logistics of participation; unfortunately currently the focus of her management must be on aggressive supportive care, but I emphasized that it is important that her family be aware of developments  for this form of ALS and FTD.    See patient instructions as well for final recommendations.

## 2019-04-11 NOTE — PROGRESS NOTES
Outpatient Speech Language Pathology Neurology Clinic Evaluation  Blanka Morton    YOB: 1960 [id number]    Visit Date: 4/11/2019    Age: 58 year old    Medical Diagnosis: Amyotrophic lateral sclerosis (ALS)    Date of Diagnosis: Per  report, pt received formal diagnosis in March 2019.  Prior to her formal diagnosis, pt was diagnosed with bulbar palsy.      Referring MD: Lew Allan MD     PMH: Refer to Medical Chart    Fall Risk:Refer to physician report.    Others at Clinic Visit:  Spouse, David     Living Situation:   Total assistance via spouse, David.  Pt and  are currently living in a hotel in [place] as they had been previously RV'ing across the country.  They are currently looking for permanent housing in [place].      Patient Concerns/Goals:  Increased swallowing difficulty  Increased speech deficits  Weight loss  Augmentative / Alternative communication needs    Observations: Pt positioned upright in wheelchair with , David, present.  Pt reported via IPad to be in significant pain.  At one point,  stepped out to the pharmacy to obtain pain medication.      Current Mode of Nutrition:   Oral diet    Weight: 119 lbs    Cardio-Respiratory Status: Forced vital capacity: 54    Functional Rating Scale (ALS-FRS): not completed this date/48    Oral Motor/Swallowing  Oral Motor Function:  Tongue fasciculation present  Significantly reduced strength, coordination, and ROM through all oral musculature.  Minimal lingual lateralization noted with no anterior elevation observed.      Volitional Abilities:  Cough- Functional  Throat Clear- Not functional  Swallow- Present , weak reduced laryngeal elevation      Feeding Assistance: Frequent cues/help required  David reported that pt typically feeds self but has been providing more assistance lately and helping entirely with PO medications.      Swallow Function:   Puree-  Anterior loss of bolus, Poor oral control of bolus, Patient report  of bolus caught in throat, Coughing, Choking and Suspect aspiration  David served pain medication whole in pudding textures which results in the above noted deficits.  Pudding served without medication resulted in reduced bolus formation/control and poor AP movement.  Pharyngeal swallow response reduced in laryngeal elevation.  Pt reported no c/o globus sensation and did not demonstrate overt s/s of aspiration.  Oral residue remaining after swallow response.    Dysphagia Diagnosis: Severe dysphagia  Elevated aspiration risk    Speech Intelligibility/Functional Communication   Methods of communication: Augmentative and alternative communication (AAC) and  reported pt texts him when she is unable to convey her message.  He further reports having several tablets but all without augmentative communication application.  Pt eliciting voicing in an effort to communicate but productions not functional.      Dysarthria: Severe    Speech:   Intelligibility- <10% intelligible   Anarthria- no functional speech    Speech Intelligibility/Communication:  Impacts daily activities, Impacts social activities, Impacts phone usage and Impacts ability to communicate basic wants/needs    Augmentative and Alternative Communication (AAC):   Currently does not use an AAC device    Clinical Impression/Plan of Care  Treatment Diagnosis: Oropharyngeal Dysphagia and Anarthria     Recommendations:  Soft, moist solid with no particulate.  Thickened liquid.  Strongly consider PEG placement:  As soon as possible.  Initiate use of AAC device.    Plan of Care:  1 session evaluation and treatment.    Goals: Based on today's evaluation session patient and/or caregiver will have understanding of current communication and/or swallowing status and recommendations for management.    Educational Assessment:  Learners- Patient and Significant other  Barriers to Learning- No barriers.    Education provided/response:    Speech  Swallowing  AAC  Diet  Feeding tube  Verbalized understanding    Treatment provided this date:   Swallow intervention, 12 minutes  Communication intervention, 10 minutes     Swallow treatment provided this date included educating pt and  re: swallow dysfunction and rationale for deficits as well as risks related to aspiration.  Pt initially reporting that she did not want a FT but open to discussing idea with David when educated on FT as well as ability to maintain PO for comfort despite FT placement and having another option for medications.    Communication intervention provided this date included educating pt and  on AAC options.   reported they have tablets but none with AAC applications.   reported that pt will text him when he is unable understand her verbal attempts.  Pt demonstrated the ability to use [employer] effectively and efficiently on clinician provided iPad during today's session.  iPad with wheelchair mount requested from ALS this date.  Pt and  encouraged to try variety of apps to determine preference.  While awaiting delivery of iPad, pt encouraged to continue text as primary method of communication.      Response to recommendations/treatment: Pt and  reported understanding and verbalized agreement.      Goal attainment: All goals met    Risks and benefits of evaluation/treatment have been explained.  Patient, family and/or caregiver are in agreement with Plan of Care.    Timed Code Treatment Minutes:   Total Treatment Time (sum of timed and untimed services): 18 minutes     Certification:  Onset date: 3/2019  Start of care date: 4/11/2019  Certification date from 4/11/2019 to 7/10/2019    I CERTIFY THE NEED FOR THESE SERVICES FURNISHED UNDER        THIS PLAN OF TREATMENT AND WHILE UNDER MY CARE     (Physician attestation of this document indicates review and certification of the therapy plan).

## 2019-04-11 NOTE — NURSING NOTE
Chief Complaint   Patient presents with     New Patient     [facility] NEW - ALS/MOTOR NEURON     Stephanie Landa

## 2019-04-11 NOTE — PROGRESS NOTES
OUTPATIENT OCCUPATIONAL THERAPY CLINIC NOTE  Blanka Morton     YOB: 1960  [id number]    Type of visit:  Evaluation            Date of service: 4/11/2019    Referring provider: Dr. Lew Allan    Others present at visit:  Spouse, David    Medical diagnosis:   Amyotrophic lateral sclerosis (ALS)     Date of diagnosis: 4/11/19     Pertinent medical history:  6/2017 onset slow speech and then dysphagia, Oct 2018 limb weakness with onset L LE and L hand and then progression R hand; Nov 2018 progression to anarthria; Depression, HTN  Seizures (2- never diagnosed with epilepsy. In the setting of low sodium in setting)      Additional Occupational Profile Information (patterns of daily living, interests, values and needs): Pt is a 58 y.o. Woman who lives with her spouse and had been living in [place].    Cardio-respiratory status:  Forced vital capacity: 54 % of predicted   BiPAP ordered today per MD  Height/Weight: NT / 119#  G-tube ordered 4/11/19  Living environment:  Hotel with spouse  [facility]    Living environment barriers:  No home at present-hotel room with roll in shower. Had travelled from [place] to [place] in RV planning to find residence her.      Current assistance/living environment:  Lives with spouse      Current mobility equipment:  Manual wheelchair    Current ADL equipment:  None     Technology used: cell phone-types texts to communicate with spouse as anarthric    Patient concerns/goals: manage spasms and pain in L LE, get equipment to help transfer pt and for showering    Evaluation   Interview completed.   Pain assessment:  Pain present: L shoulder (with PROM), hip and knee (spasms)    Range of motion:  UE AROM is impaired to less than shoulder height and R > L-able to feed self. 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 No      Has the patient fallen and had an injury in the past year? No       Timed Up and Go Score: Not able to perform due to weakness    Is the patient a fall risk? Yes, department fall risk interventions implemented     Impairments:  Fatigue  Muscle atrophy  Coordination  Balance  Pain  Range of motion  Skin integrity  Respiratory compromise  Impaired communication     Treatment diagnosis:  Impaired mobility  Impaired activities of daily living    Assessment of Occupational Performance: 3-5 Performance Deficits  Identified Performance Deficits (ie: feeding, social skills): toileting, bathing, feeding, grooming, dressing  Clinical Decision Making (Complexity): Moderate complexity     Recommendations/Plan of care:  Patient would benefit from interventions to enhance safety and independence.  Rehab potential good for stated goals.  Occupational therapy intervention for  self care/home management.  1 session evaluation & treatment.  Recommend referral to home care OT and PT and SLP with home OT for lane transfer training and also power wheelchair assessment.    Goals:   Target date: today  Patient, family and/or caregiver will verbalize understanding of evaluation results and implications for functional performance.  Patient, family and/or caregiver will verbalize/demonstrate safe transfer techniques.  Patient, family and/or caregiver will verbalize/demonstrate understanding of positioning techniques/equipment.      Educational assessment/barriers  "to learning:  Communication as no communication device with pt today-did not have her cell phone with today      Treatment provided this date:   Self care/home management, 15 minutes of training in :  Purpose of wheeled shower commode chair for showering and toileting and reducing number of transfers  Purpose of manual lift to prevent injury and purpose of home care therapies for training in use  Purpose of pressure relieving cushion while up in manual w/cwith CarmenO cushion requested 16 x 16\"  * all items above requested from [facility] loan program as pt to get registered today  Purpose of home care OT for power w/c assessment, option to loan from [facility], purpose for UE ROM and training in UE ROM program and in addressing UE splinting needs for L hand and other AE needs for daily living.  -training that once they move to an apt that a hospital bed can be requested from [facility] as well   Therapeutic procedure: 4 min   Of stretching provided to L hamstring with slow prolonged stretch x 1 min to bring knee toward ext due to pt experiencing pain from spasticity, pt able to nod that this did improve comfort, then SLP sought for communication device as pt wanted to convey further information but due to anarthria was unable.    Response to treatment/recommendations: receptive, pt attempts to mouth words and therapist and spouse cannot understand so this therapist sought SLP therapist to provide ACC for pt to communicate today    Goal attainment:  All goals met    Risks and benefits of evaluation/treatment have been explained.  Patient, family and/or caregiver are in agreement with Plan of Care.     Timed Code Treatment Minutes: 15  Total Treatment Time (sum of timed and untimed services): 30    Signature: ESTRELLA Garcia/JOHN, [facility]   Date: 4/11/2019       "

## 2019-04-18 NOTE — PROGRESS NOTES
Nutrition Services:     Patient was seen by multidisciplinary ALS clinic team on 4/11.  RD was unavailable to meet with patient and  at this time.    MD and SWAPNA requested RD to call patient regarding PEG tube logistics.  Patient is scheduled to have PEG placed on 4/30 and PLC on 5/1.      RD spoke with patient's , David.  David tells me that she has been eating mostly soft foods, high protein shakes/smoothies.    He is not able to quantify volume or calories consumed.    He is able confirm PEG placement and PLC appt for next month.      RD reviewed role of PEG tube, supplies, formula, feeding types, water flushes, role of home infusion company etc.    David reports that he does not have experience with a feeding tube.  He expresses that it 'will not be too hard to learn'.    He denies questions for RD at this time.   RD confirmed that he and Blanka will receive further FT education at the time of placement.     RD was not able to provide contact to David as he was driving.     Staci Dominique RDN, [facility]  [phone]

## 2019-04-24 NOTE — PROGRESS NOTES
Social Work Follow-Up  [facility]    Data/Intervention:  Patient Name:  Blanka ALMONTE/Age:  1960 (58 year old)    Reason for Follow-Up:  housing    Collaborated With:    -David, Pt's spouse    Intervention/Education/Resources Provided:  David reports that they are waiting to get the ipad so Blanka can communicate. They also haven't been able to get the Rx that Dr Allan ordered as the pharmacy didn't receive the rx.   They are looking at an accessible apartment today in [place] that they are hopeful about. He did contact [facility] for help with options. He contacted the places they suggested but they all have waiting lists. I encouraged him to contact them again if today's visit is not successful.     Assessment/Plan:  David is stressed with the multiple issues he is trying to juggle and resolve.  Will f/u with Dr Allan/Gia re above rx.  Remain avail    Previously provided patient/family with writer's contact information and availability.       Blanka Armas, SURINDER, [facility]    [facility]  [phone]

## 2019-04-29 NOTE — H&P
[facility] History and Physical    Blanka Morton MRN# [id number]   Age: 58 year old YOB: 1960     Date of Admission: 4/30/2019    Primary care provider: No Ref-Primary, Physician          Chief Complaint:   Status-post PEG tube placement.           History of Present Illness:   Ms Blanka Morton is a 58 year old woman with a history of progressive dysarthria, dysphagia, left greater than right bilateral limb weakness and a diagnosis of ALS (known [id number] mutation and family history of ALS).     Patient history was obtained from Dr Allan's clinic notes and confirmed with the patient.   In June 2017, Ms Morton began to notice changes in her speech. In October 2018, she tripped on her left foot while at a vacation site. Over time, her left leg became weaker. Left hand and then arm weakness began at around November 2018. In March of 2019 her right hand has also started to become weak. Up until last month, she was able to walk with some assistance however has now progressed to using a wheelchair full time. In November 2018 she became anarthric and dysphagia began with solids and later with water. She eats only soft and pureed foods and thickened liquids. She has no coughing or choking. She is on glycopyrrolate which helps with sialorrhea. She is also on Nudexta which helps with emotional lability.    She does not report any dyspnea or orthopnea. She does not snore during the day or have apneic episodes. She reports having had a stable weight over the last 1-2 months. However reports intentionally losing 40 pounds over the past 2 years. She does have left hip and leg pain due to stiffness which at times keeps her up at night. No chest pain or palpitations, no shortness of breath currently. She has been having leg cramps at home but reports no discomfort at this time. No headache or vision problems.    She underwent PEG tube placement due to her dysphagia and tolerated the procedure well without any  complications. She is admitted for PEG tube site monitoring, initiating tube feeds and PEG tube/TF teaching.              Past Medical History:   - ALS (C9orf mutation)  - Hypertension  - Depression  - History of seizures in the setting of hyponatremia          Past Surgical History:   No past surgical history on file.          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 No alcohol use. Ex-smoker. Lives with her , David at home.           Family History:   The following family members were all were diagnosed with ALS and passed away. It is unknown if any family member underwent genetic testing.  Mother 41, Maternal aunt 69   Brother Wilber Rodriguez 60  Brother Rodriguez 52          Allergies:   No Known Allergies          Medications:   - Riluzole 50mg BID  - Meloxicam 7.5mg daily  - Baclofen 20mg AM and 20mg PM and 30mg HS (recently increased by Dr Allan on 4/22)  - Tramadol 50mg HS  - Nudexa 20-10mg BID  - Glycopyrrolate 2mg PRN          Review of Systems:   As per HPI          Physical Exam:   Vitals:   Temp 36.1  Pulse 88  RR 12  /67  HR 88    Constitutional: WD, WN  Head: atraumatic, anicteric. See neuro exam.  Eyes: see neuro exam.  CVC: RRR no murmurs  LUng: Clear. No respiratory distress.   Adomen: Nondistended. Gastrostomy  Extremities: No edema  Psychiatry: Mood pleasant, affect congruent.     Neuroexam  MS: Alert and oriented to place, date, self and reasons of hospitalization.  Follow commands.  CN: PERRL, EOMI without nystagmus, VFF, face weak throughout and symmetric, nondysarthric, shoulder shrug 4-/5 bilaterally, minimal palatal elevation, unable to protrude tongue.  MOTOR: Strength 4/5 SA and EF bilaterally. EE 3/5 bilaterally.  4-/5 bilaterally. Lower extremities with marked spasticity worse on left.   REFLEXES: 3/4 throughout at biceps, brachioradialis,  Patellars. 2/4 achilles. Toes upgoing  SENSORY: Intact to light  tough throughout. No sensory extinction.   COORDINATION: FNF intact without dysmetria, PEE intact  GAIT: Not ambulatory            Data:   WBC - 5.4  Hgb - 12.7  Platelets - 220    INR - 1.15         Assessment and Plan:    Ms Blanka Morton is a 58 year old woman with a diagnosis of ALS who underwent PEG tube placement on 4/30/19. She is admitted for PEG tube site monitoring, initiating tube feeds and PEG tube/TF teaching.     #ALS  #s/p PEG tube placement  - NPO for for hours post-PEG and then initiate TFs at rate per nutrition consult reccomendations  - Nutrition consult, appreciate assistance  - SLP evaluation  - Daily BMPs  - IV Dilaudid 0.2mg Q4H PRN for PEG tube site pain  - Continue PTA meds PO (Riluzole, Nudexa) once no longer NPO  - Glycopyrrolate PRN  - Follows with Dr Allan at the outpatient clinic with a follow up appointment scheduled for 6/20    #Leg pain/stiffness  - Continue PTA baclofen 20mg AM and 20mg PM and 30mg HS (  - Continue PTA tramadol   - Will hold meloxicam    #Hypertension  - Continue PTA losartan 50mg daily    #Depression  - Continue PTA escitalopram 10mg daily    DVT ppx: PCDs  FEN: IVFs until TFs at goal  Code status: Full     Patient seen and discussed with Dr. Kuhn who agrees with the assessment and plan.     Lilia Garner  Neurology Resident, PGY2  Pager: [phone]    Wilber Naidu MD on 4/30/2019 at 4:38 PM  Neurology PGY3

## 2019-04-30 PROBLEM — Z93.1 S/P PERCUTANEOUS ENDOSCOPIC GASTROSTOMY (PEG) TUBE PLACEMENT (H): Status: ACTIVE | Noted: 2019-01-01

## 2019-04-30 NOTE — PROGRESS NOTES
Pt arrived to 2A in a wheelchair for gastroostomy placement. Pt has ALS and is unable to speak. Pt has physical limitations including movement of arms/legs.  Pt's  who is in the room provided information.  VSS. IV placed, but unable to draw back for lab sample. Called lab to come and draw patient. Pt is not menstruating, therefore does not need a pregnancy test. H&P up to date and consent needs to be signed.     0940: Called lab again for labs to be drawn. Called NP and updated that pt did not take oral contrast meds. NP ok since patient has ALS.     1007: Lab in the room. Notified Lab to discontinue previous labs and run labs that are being drawn now.     1012: Notified IR regarding looking at labs results drawn at 1013.     1030: Consent signed. Lab results pending. Pt's  is MIGUEL, his phone number is [phone]

## 2019-04-30 NOTE — PROGRESS NOTES
CLINICAL NUTRITION SERVICES - ASSESSMENT NOTE     Nutrition Prescription    RECOMMENDATIONS FOR MDs/PROVIDERS TO ORDER:  - Recommend ordering PO4 replacement protocol d/t risk for refeeding with initiation/adv of TF  - Recommend to continue MIVF's for hydration until pt reaches goal rate on TF since H20 flushes with TF are only for patency at this time.    Malnutrition Status:    Non-severe malnutrition in the context of acute illness based on available data.      Recommendations already ordered by Registered Dietitian (RD):  - Order lab add on to check Mg and PO4 for review  - Orders entered to initiate TF vis PEG with Nutren 1.5 # 10 ml/hr x 8 hrs with adv by 10 ml every 8 hrs to goal of 45 ml/hr. Regimen to provide 1080 ml, 1620 kcals (30 kcal/kg/day), 73 g PRO (1.4 g/kg/day), 821 mL H2O, 190 g CHO and no Fiber daily.  - Order multivitamin/mineral (Certavite 15 ml/day) via TF to help ensure micronutrient needs are met due slow TF adv and potential for interruptions to infusion.  - Order daily check of K+, Mg and PO4 until TF adv to goal rate to evaluate for refeeding and need for lyte replacement (per already ordered lyte protocol).  - H2O flushes of 30 ml every 4 hrs (for tube patency only)      Future/Additional Recommendations:  - Monitor for transitioning to bolus TFs vs cyclic feeds once pt reaches goal rate on continues TFs.   - Need to adjust H20 flushes to meet 100% of pt's hydration needs (90 ml H20 x 6 flushes per day) pending SLP evaluation for swallow study.     REASON FOR ASSESSMENT  Blanka Morton is a/an 58 year old female assessed by the dietitian for Admission Nutrition Risk Screen for unintentional loss of 10# or more in the past two months and Provider Order - Registered Dietitian to Assess and Order TF per Medical Nutrition Therapy Protocol. Pt is s/p placement of PEG tube by IR today. (Tube to gravity drainage for 4 hrs - ready to use at 4:30 pm per RN)    NUTRITION HISTORY  Attempted to obtain  "diet hx from pt (no family members present), but information limited to pt writing (pt is non verbal) pudding, yogurt and protein shake. Pt unable to provide any further details of po intakes PTA. Per chart review, reported that pt has been consuming only soft/pureed foods and thickened liquids d/t dysphagia that began in November 2018. Also mentioned by RN that pt has had difficulty feeding herself PTA.    CURRENT NUTRITION ORDERS  Diet: NPO  - SLP consult for swallow evaluation pending at this time    LABS  K+ WNL, no Mg or PO4 available at this time for review  NA++ 144 (high end of normal limits); suggestive of volume depletion/dehydration    MEDICATIONS  MIVF's of normal saline @ 75 ml/hr.     ANTHROPOMETRICS  Height: 165.1 cm (5' 5\")  Most Recent Weight: 54 kg (119 lb)    IBW: 56.8 kg (95% of IBW)  BMI: Low end Normal BMI   Weight History: Per chart review, h/o intentional 40 lb wt loss over the past 2 years. Wt reported to be stable for the last 1-2 mos ( wt of 54.4 kg noted on 3/13 per review of Care Everywhere). Pt is borderline underweight with BMI of 19.8 kg/m2  Wt Readings from Last 10 Encounters:   04/30/19 54 kg (119 lb)   04/11/19 54.3 kg (119 lb 12.8 oz)       Dosing Weight: 54 kg    ASSESSED NUTRITION NEEDS  Estimated Energy Needs: 1220-1416 kcals/day (25 - 30 kcals/kg)  Justification: Maintenance  Estimated Protein Needs: 65-81 grams protein/day (1.2 - 1.5 grams of pro/kg)  Justification: Hypercatabolism with acute illness  Estimated Fluid Needs: (1 mL/kcal)   Justification: Maintenance    PHYSICAL FINDINGS  See malnutrition section below.  Wheelchair bound for approximately the past month per chart review    MALNUTRITION  % Intake: Unable to assess  % Weight Loss: Weight loss does not meet criteria  Subcutaneous Fat Loss: Facial region, Upper arm and Thoracic/intercostal: Mild  Muscle Loss: Temporal; Mild, Thoracic region (clavicle, acromium bone, deltoid, trapezius, pectoral), Upper arm (bicep, " tricep), Lower arm  (forearm), Upper leg (quadricep, hamstring), Patellar region and Posterior calf: Mild/Moderate  Fluid Accumulation/Edema: None noted  Malnutrition Diagnosis: Non-severe malnutrition in the context of acute illness based on available data.    NUTRITION DIAGNOSIS  Inadequate protein-energy intake related to h/o dysphagia and self feeding difficulties hindering po and TF therapy ordered, but not yet initiated as evidenced by pt writing that her po intakes consists of pudding, yogurt and protein shake at home, low BMI of 19.8 and no TF intakes received at this time.     INTERVENTIONS  Implementation  Nutrition Education: Provided education to pt on plan for TF therapy    Enteral Nutrition - Initiate  Feeding tube flush  Multivitamin/mineral supplement therapy     Goals  Total avg nutritional intake to meet a minimum of 25 kcal/kg and 1.2 g PRO/kg daily (per dosing wt 54 kg).     Monitoring/Evaluation  Progress toward goals will be monitored and evaluated per protocol.  Tahmina Daniels RD,LD  Unit pager [phone]

## 2019-04-30 NOTE — PLAN OF CARE
SLP consult received. Chart reviewed and case discussed with RN who reports the patient is still medically NPO after G tube placement. Plan coordinated for SLP to evaluate tomorrow morning at 10:30 when the patient can take PO and when family is here to assist (she is nonverbal per RN).

## 2019-04-30 NOTE — PLAN OF CARE
Status: Arrived to the floor at 12:30 after PEG placement.  and daughter at bedside and very helpful with care info.  Vitals: Stable  Neuros: Oriented x 4 with choices.Unable to talk, nods or points to words on board or ipad. LEDESMA weak. Has no fine finger movement, using pad call light.  IV: R hand PIV at 75 ml/hr.  Diet: NPO. Speech will see tomorrow.Can start TF at 4:30pm. Pump ordered.   Bowel status: no BM this shift  : no void yet,  states she voids ,infrequently  Skin: skin tear on left upper inner buttock, that  states she has had, some blood present on brief.  Pain: denies currently, but  states she has bad pain with her L leg spasms.Denies PEG site pain. Sme dried blood present on PEG dressing.  Activity: turns with 2 assist. states that he lifts her to wheelchair.  Plan: Begin TF once we have TF orders.

## 2019-04-30 NOTE — PHARMACY-CONSULT NOTE
Pharmacy Tube Feeding Consult    Medication reviewed for administration by feeding tube and for potential food/drug interactions.    Recommendation: No changes are needed at this time.     Pharmacy will continue to follow as new medications are ordered.      Jazzy Ny, PharmD  Pager: [phone]

## 2019-04-30 NOTE — PROGRESS NOTES
Interventional Radiology Pre-Procedure Sedation Assessment   Time of Assessment: 10:31 AM    Expected Level: Moderate Sedation    Indication: Sedation is required for the following type of Procedure: GI    Sedation and procedural consent: Risks, benefits and alternatives were discussed with Patient    PO Intake: Appropriately NPO for procedure    ASA Class: Class 3 - SEVERE SYSTEMIC DISEASE, DEFINITE FUNCTIONAL LIMITATIONS.    Mallampati: Grade 3:  Soft palate visible, posterior pharyngeal wall not visible    Lungs: Lungs Clear with good breath sounds bilaterally    Heart: Normal heart sounds and rate    History and physical reviewed and no updates needed. I have reviewed the lab findings, diagnostic data, medications, and the plan for sedation. I have determined this patient to be an appropriate candidate for the planned sedation and procedure and have reassessed the patient IMMEDIATELY PRIOR to sedation and procedure.    Reynaldo Fox MD

## 2019-04-30 NOTE — PROCEDURES
Interventional Radiology Brief Post Procedure Note    Procedure: IR GASTROSTOMY TUBE PERCUTANEOUS PLCMNT    Proceduralist: Reynaldo Fox MD    Assistant: Bryant Giraldo MD and None    Time Out: Prior to the start of the procedure and with procedural staff participation, I verbally confirmed the patient s identity using two indicators, relevant allergies, that the procedure was appropriate and matched the consent or emergent situation, and that the correct equipment/implants were available. Immediately prior to starting the procedure I conducted the Time Out with the procedural staff and re-confirmed the patient s name, procedure, and site/side. (The Joint Commission universal protocol was followed.)  Yes    Medications   Medication Event Details Admin User Admin Time       Sedation: IR Nurse Monitored Care   Post Procedure Summary:  Prior to the start of the procedure and with procedural staff participation, I verbally confirmed the patient s identity using two indicators, relevant allergies, that the procedure was appropriate and matched the consent or emergent situation, and that the correct equipment/implants were available. Immediately prior to starting the procedure I conducted the Time Out with the procedural staff and re-confirmed the patient s name, procedure, and site/side. (The Joint Commission universal protocol was followed.)  Yes       Sedatives: Fentanyl and Midazolam (Versed)    Vital signs, airway and pulse oximetry were monitored and remained stable throughout the procedure and sedation was maintained until the procedure was complete.  The patient was monitored by staff until sedation discharge criteria were met.    Patient tolerance: Patient tolerated the procedure well with no immediate complications.    Time of sedation in minutes: 40 minutes from beginning to end of physician one to one monitoring.    Findings: 18 Fr. Gastrostomy tube placed using standard technique. No immediate complications.      Estimated Blood Loss: Minimal    Fluoroscopy Time: 4.9  minute(s)    SPECIMENS: None    Complications: 1. None     Condition: Stable    Plan: Patient to be admitted. Tube to gravity drainage for 4 hours and may be used after that.     Comments: See dictated procedure note for full details.    Bryant Giraldo MD

## 2019-04-30 NOTE — PROGRESS NOTES
Patient Name: Blanka Morton  Medical Record Number: [id number]  Today's Date: 4/30/2019    Procedure: Gastrostomy Tube Placement  Proceduralist: Dr. Fox    Sedation start time: 1120  Sedation end time: 1150  Sedation medications administered: 2 mg Versed, 100 mcg Fentanyl   Total sedation time: 30 min     Procedure start time: 1120  Puncture time: 1125  Procedure end time: 1150    Report given to: 6A RN    Other Notes: Pt arrived to IR room 4 from 2A. Consent reviewed. Pt denies any questions or concerns regarding procedure. Pt positioned supine and monitored per protocol. Pt tolerated procedure without any noted complications. Pt transferred back to 6A.

## 2019-05-01 NOTE — PLAN OF CARE
Alert and oriented x 4 with choices, nods and shakes head due to severe aphasia. Denies pain and nausea. Tube feeding running at 20 ml/hr. It was increased to this rate at 0115 so will need to go up by 10 ml at 0915. NS at 75 ml/hr.Repositioned Q 2 hours.

## 2019-05-01 NOTE — DISCHARGE SUMMARY
"[facility]  NEUROLOGY DISCHARGE SUMMARY    Patient Name:  Blanka Morton  MRN:  [id number]      :  1960      Date of Admission:  2019  Date of Discharge::  2019  Admitting Physician:  Chip Kuhn MD  Discharge Physician:  Lilia Garner MD, Chip Kuhn MD  Primary Care Provider:   No Ref-Primary, Physician  Discharge Disposition:   Discharged to home    Admission Diagnoses:  1.  ALS    Discharge Diagnoses:    1.  ALS, s/p PEG    Brief History of Illness:   Per H&P on 19:  \"Ms Blanka Morton is a 58 year old woman with a history of progressive dysarthria, dysphagia, left greater than right bilateral limb weakness and a diagnosis of ALS (known [id number] mutation and family history of ALS).      Patient history was obtained from Dr Allan's clinic notes and confirmed with the patient.   In 2017, Ms Morton began to notice changes in her speech. In 2018, she tripped on her left foot while at a vacation site. Over time, her left leg became weaker. Left hand and then arm weakness began at around 2018. In 2019 her right hand has also started to become weak. Up until last month, she was able to walk with some assistance however has now progressed to using a wheelchair full time. In 2018 she became anarthric and dysphagia began with solids and later with water. She eats only soft and pureed foods and thickened liquids. She has no coughing or choking. She is on glycopyrrolate which helps with sialorrhea. She is also on Nudexta which helps with emotional lability.     She does not report any dyspnea or orthopnea. She does not snore during the day or have apneic episodes. She reports having had a stable weight over the last 1-2 months. However reports intentionally losing 40 pounds over the past 2 years. She does have left hip and leg pain due to stiffness which at times keeps her up at night. No chest pain or palpitations, no " "shortness of breath currently. She has been having leg cramps at home but reports no discomfort at this time. No headache or vision problems.\"    Hospital Course:  Ms Morton underwent PEG tube placement by interventional radiology and tolerated the procedure well without any complications. She is admitted for PEG tube site monitoring, initiating tube feeds and PEG tube/TF teaching. She was initially kept NPO and then tube feeds were advanced at a rate of 10ml per hour every 8 hours. She was evaluated by SLP who recommended a nectar thick diet for pleasure feeds. She was also provided with PLC teaching for PEG tube care and tube feeds.    Her baclofen was increased to 30mg AM, 30mg PM and 40mg HS to help with her leg stiffness and pain. In addition, an outpatient PM&R referral was placed to help with treatment of her spasticity. Tramadol and Flexiril were discontinued and bedtime Tylenol PRN was added. She was also prescribed artifical tear ointments for bedtime as she tends to sleep with her eyes open at night. The remainder of her hospital stay was unremarkable and she was discharged home.     Pertinent Investigations:  CBC, BMP, Mg & Phos all within normal limits.    Consultations:    Interventional radiology  Nutrition  SLP    Discharge Medications:  Current Discharge Medication List      START taking these medications    Details   multivitamins w/minerals (CERTAVITE) liquid 15 mLs by Per Feeding Tube route daily  Qty: 1 Bottle, Refills: 3    Associated Diagnoses: ALS (amyotrophic lateral sclerosis) (H)         CONTINUE these medications which have CHANGED    Details   baclofen (LIORESAL) 10 MG tablet Take 2 tablets in the AM, 2 tablets in the PM and 3 tablets every evening by mouth or feeding tube.  Qty: 120 tablet, Refills: 3    Associated Diagnoses: ALS (amyotrophic lateral sclerosis) (H)      cyclobenzaprine (FLEXERIL) 10 MG tablet 1 tablet (10 mg) by Oral or Feeding Tube route 2 times daily  Qty: 30 tablet, " Refills: 1    Associated Diagnoses: ALS (amyotrophic lateral sclerosis) (H)      dextromethorphan-quiNIDine (NUEDEXTA) 20-10 MG capsule 1 capsule by Oral or Feeding Tube route every 12 hours  Qty: 30 capsule, Refills: 1    Associated Diagnoses: ALS (amyotrophic lateral sclerosis) (H)      escitalopram (LEXAPRO) 10 MG tablet 1 tablet (10 mg) by Oral or Feeding Tube route daily  Qty: 30 tablet, Refills: 1    Associated Diagnoses: Mild major depression (H)      glycopyrrolate (ROBINUL) 1 MG tablet 2 tablets (2 mg) by Oral or Feeding Tube route as needed  Qty: 30 tablet, Refills: 1    Associated Diagnoses: ALS (amyotrophic lateral sclerosis) (H)      losartan (COZAAR) 50 MG tablet 1 tablet (50 mg) by Oral or Feeding Tube route daily  Qty: 30 tablet, Refills: 1    Associated Diagnoses: Benign essential hypertension      riluzole (RILUTEK) 50 MG tablet 1 tablet (50 mg) by Oral or Feeding Tube route every 12 hours  Qty: 60 tablet, Refills: 3    Associated Diagnoses: ALS (amyotrophic lateral sclerosis) (H)      traMADol (ULTRAM) 50 MG tablet 1 tablet (50 mg) by Oral or Feeding Tube route At Bedtime  Qty: 30 tablet, Refills: 3    Associated Diagnoses: ALS (amyotrophic lateral sclerosis) (H)           Discharge physical examination:   Vitals:  B/P: 121/71, T: 96.1, P: 88, R: 14  General:  Adult, in NAD, cooperative  HEENT:  NC/AT  Cardiac:  RRR, no m/r/g  Chest:  Clear to auscultation bilaterally  Abdomen:  Soft, non-tender, gastrostomy site c/d/i  Extremities:  No LE swelling.    Skin:  No rash or lesion.    Psych:  Mood pleasant, affect congruent  Neuro:  Mental status:  Awake, alert, attentive, oriented. Follows commands appropriately. Anarthric.  Cranial nerves:  PERRL, EOMI with no nystagmus, face weak throughout especially in orbicularis oris, shoulder shrug 4-/5, minimal palate elevation, unable to protrude tongue.   Motor:  Increased spasticity in all extremities, LE > UE, LLE is most marked. Strength 4-/5 in SAB, EF  bilaterally. 3/5 in EE bilaterally.  4-/5. Minimal movements in LE appreciated.   Reflexes: 3+ and symmetric in upper extremities (pectoralis, biceps, brachioradialis), patellar and ankles at 2+.  Toes upgoing.  Sensory:  Intact to light touch throughout.   Coordination:  Intact FNF without dysmetria.  Gait:  Non ambulatory.      Discharge follow up and instructions:    1.  Diet: Per TF: Regimen of Nutren 1.5 with goal of 1 can x 4 feedings daily to provide 1000 ml/day, 1500 kcals (28 kcal/kg), 68 g PRO (1.3 g/kg/day), 760 ml H2O, 176 g CHO and no Fiber. Nectar thick for pleasure feeds.   2.  Activity:  Activity as tolerated  3.  Restrictions:  None new  4.  Follow up:  Follow up with Dr. Allan with at scheduled appointment (6/20/19). Follow up with PM&R.     A total of 30 minutes was spent on discharge activities.     Patient seen and discussed with Dr. Kuhn.     Lilia Garner  Neurology Resident, PGY2  Pager: [phone]

## 2019-05-01 NOTE — PLAN OF CARE
Status: POD#0 s/p PEG placement for ALS.  Vitals: VSS on RA.  Neuros: Oriented x 4 with choices. Nods head to yes or no questions. Also uses communication tool on ipad. Soft touch call light in use.  IV: R hand PIV at 75 ml/hr NS.  Diet: NPO.Tube feeding started at 5:30 at 10 ml/hour. Speech plans to see her tomorrow.   GI: 2 BM's this shift.  Gu: voided x2 this shift.  reports that she voids infrequently.  Skin: skin tear on Left medial buttocks. PEG dressing changed with small amount of bloody drainage.  Pain: denies pain other then during Left leg spasms, straightening her leg during spasms helps relieve pain. Denies PEG site pain.   Activity: turned Q2H with assist of 2.  Plan: continue tube feeds, may advance per order at 0130. Provide oral swab for increased oral secretions.

## 2019-05-01 NOTE — PROGRESS NOTES
05/01/19 1217   General Information   Onset Date 04/30/19   Start of Care Date 05/01/19   Referring Physician Lilia Garner MD   Patient Profile Review/OT: Additional Occupational Profile Info See Profile for full history and prior level of function   Patient/Family Goals Statement Pt wants to be able to eat again   Swallowing Evaluation Bedside swallow evaluation   Behaviorial Observations WFL (within functional limits)  (Pt with profound dysarthria related to ALS)   Mode of current nutrition NPO;PEG   Respiratory Status Room air   Comments Orders received for swallow evaluation. Pt with a history of progressive dysarthria, dysphagia, left greater than right bilateral limb weakness and a diagnosis of ALS (known [id number] mutation and family history of ALS). Now admitted s/p PEG placement.    Clinical Swallow Evaluation   Oral Musculature anomalies present  (impaired strength, ROM, and coordination)   Dentition present and adequate   Mucosal Quality dry   Mandibular Strength and Mobility impaired   Oral Labial Strength and Mobility impaired retraction;impaired pursing;impaired seal;impaired coordination   Lingual Strength and Mobility impaired protrusion;impaired anterior elevation;impaired left lateral movement;impaired right lateral movement;impaired coordination   Buccal Strength and Mobility impaired   Laryngeal Function Voicing initiated  (weak cough)   Additional Documentation Yes   Clinical Swallow Eval: Thin Liquid Texture Trial   Diagnostic Statement Not trialed this date. Pt drinks thickened liquids at baseline due to aspiration risk   Clinical Swallow Eval: Nectar Thick Liquid Texture Trial   Mode of Presentation, Nectar spoon  (fed by Pt's )   Volume of Nectar Presented tsp sips x6   Oral Phase, [place] Poor AP movement;Residue in oral cavity;Premature pharyngeal entry  (anterior loss )   Oral Residue, Nectar left lip drooling;right lip drooling   Pharyngeal Phase, Nectar   (no overt s/sx of  aspiration observed)   Diagnostic Statement swallow appears grossly intact for small sips of nectar-thick liquids via tsp   Clinical Swallow Eval: Puree Solid Texture Trial   Diagnostic Statement not presented during evaluation due to Pt report of significant leg pain and declining additioal PO trials. Pt and family report functional tolerance of small bites of purees (i.e. yogurt), which is consistent with ST evaluation at last ALS clinic visit (4/11/19)   Clinical Swallow Eval: Semisolid Texture Trial   Diagnostic Statement advanced solid textures not appropriate due to known oral dysphagia and difficulty with mastication/bolus manipulation   Swallow Compensations   Swallow Compensations Pacing;Reduce amounts   Esophageal Phase of Swallow   Patient reports or presents with symptoms of esophageal dysphagia No   General Therapy Interventions   Planned Therapy Interventions Dysphagia Treatment   Dysphagia treatment Instruction of safe swallow strategies;Modified diet education   Swallow Eval: Clinical Impressions   Skilled Criteria for Therapy Intervention Skilled criteria met.  Treatment indicated.   Functional Assessment Scale (FAS) 1   Treatment Diagnosis severe dysphagia   Diet texture recommendations Nectar thick liquids;Full liquid   Recommended Feeding/Eating Techniques maintain upright posture during/after eating for 30 mins;small sips/bites   Predicted Duration of Therapy Intervention (days/wks) evaluation and one time treat only   Anticipated Discharge Disposition home w/ assist   Risks and Benefits of Treatment have been explained. Yes   Patient, family and/or staff in agreement with Plan of Care Yes   Clinical Impression Comments Clinical swallow evaluation completed per MD order. Pt presents with severe oral-pharyngeal dysphagia in the setting of progressive ALS. Pt demonstrates poor oral awareness, impaired bolus formation/control, anterior loss of bolus, poor A-P movement, and elevated aspiration risk.  No overt s/sx of aspiration with nectar-thick liquids via tsp on exam. Additional textures not observed due to Pt report of pain in legs, limiting trials, but Pt/family report appears consistent with ST evaluation at time of last ALS clinic visit (4/11/19). Recommend PEG as primary source of nutrition/hydration and for all medications. Ok for nectar-thick full liquid diet as tolerated for pleasure/comfort (ice cream/popsicles ok, despite melting). Pt to sit completely upright for all PO intake and all PO should be administered via spoon. Pt and family educated re: diet recommendaitons, thickening liquids, and safe swallow strategies. Ongoing intervention is not indicated during admission. Will sign off. Recommend follow up with ST at time of next ALS clinic visit.   Total Evaluation Time   Total Evaluation Time (Minutes) 9

## 2019-05-01 NOTE — PLAN OF CARE
Status: Admitted for PEG placement  Vitals: VSS  Neuros: A/O x4, used clip board in room for choices. Patient unable to speak, nods yes and no appropriately. Right upper extremity 4/5, LUE and BLE 3/5.   IV: PIV saline locked   Diet: Dd2, nectar thick liquids, TF 45 ml/hr at 9539-6902. Bolus of 90 ml at 1830.   Bowel status: Small soft BM   : Incontinent, attends on   Skin: Skin tear on left upper buttocks, open to air  Pain: C/O left leg cramping pain given tylenol 650 mg  Activity: Up to wheelchair,  lifted patient. Had patient learning center at 1130 with  and daughter present.   Social:  David, and daughter Cecilio in room   Plan: Discharge to home with  after bolus feeding and home care set up. Some supplies in room

## 2019-05-01 NOTE — PLAN OF CARE
Discharge Planner SLP   Patient plan for discharge: unknown  Current status: Clinical swallow evaluation completed per MD order. Pt presents with severe oral-pharyngeal dysphagia in the setting of progressive ALS. Pt demonstrates poor oral awareness, impaired bolus formation/control, anterior loss of bolus, poor A-P movement, and elevated aspiration risk. No overt s/sx of aspiration with nectar-thick liquids via tsp on exam. Additional textures not observed due to Pt report of pain in legs, limiting trials, but Pt/family report appears consistent with ST evaluation at time of last ALS clinic visit (4/11/19). Recommend PEG as primary source of nutrition/hydration and for all medications. Ok for nectar-thick full liquid diet as tolerated for pleasure/comfort (ice cream/popsicles ok, despite melting). Pt to sit completely upright for all PO intake and all PO should be administered via spoon. Pt and family educated re: diet recommendaitons, thickening liquids, and safe swallow strategies. Ongoing intervention is not indicated during admission. Will sign off. Recommend follow up with ST at time of next ALS clinic visit.  Barriers to return to prior living situation: TF dependence  Recommendations for discharge: home with assist  Rationale for recommendations: Recommend follow up with ST at time of next ALS clinic visit       Entered by: Shelly Myers 05/01/2019 12:31 PM

## 2019-05-01 NOTE — CONSULTS
05/01/19 1246 Lamar Stovall, ARNULFO       Pt, spouse and daughter attended G-tube feeding class. Spouse RD correctly on model with all skills including site care, anchoring tube,  water flushes, medication administration, bolus and gravity feeding. Spouse was encouraged to practice skills with RN when appropriate. Spouse states he understands all information presented. Was concerned about supplies and formula. Currently is making her smoothies as her main source of nutrition

## 2019-05-01 NOTE — PROGRESS NOTES
[facility] note        I met briefly with Blanka and David today. She tolerated gastrostomy well and is tolerating tube feedings. She has received her AAC device at home and is using it. She is tolerating NIV at home and using cough assist. She is using anticholinergics for sialorrhea.    Her chief management concern is continued painful episodic flexion at the left hip and knee which impacts her sleep. This is less of a problem during the day when she is seated. On examination there is indeed marked increase in tone in the left lower limb. There is full range of motion to passive stretch. There is modest exacerbation of left hip pain with internal rotation. There is not substantial tenderness over the hip joint. She and her  deny muscle cramps per se; it appears the chief complaint is a consequence of spasticity. She has had inadequate relief on her current baclofen dose and no benefit with pain medications (meloxicam, tramadol).    Recommendations:    1. Increase baclofen to 30 mg in AM, 30 mg in afternoon, and 40 mg in evening.  2. PM&R clinic referral for assistance with spasticity management. Options include continued up-titration of baclofen, tizanidine, dantrolene, baclofen pump, or botox. Although I am cautious about use of botox in ALS, given her current functional status and the restricted geographic involvement, this may be a good option here.  3. Home PT to re-teach and encourage stretches.  4. Discontinue tramadol, cyclobenzaprine. Trial of tylenol at HS.    Discussed with patient, her family, and managing service. Thank you for caring for her.    Lew Allan M.D.  [phone]

## 2019-05-01 NOTE — PROGRESS NOTES
CLINICAL NUTRITION SERVICES - BRIEF NOTE     Nutrition Prescription      Recommendations already ordered by Registered Dietitian (RD):  - Modified TF orders of Nutren 1.5 to reflect change in adv to goal rate on continuous TFs as follows; increase TF rate to goal of 45 ml/hr at 2:30 pm today x 3 hrs. If tolerated, then stop continuous TF for 1 hr to let stomach empty prior to starting gravity feeding.  - Begin first bolus with 90 ml x 1 feeding and if tolerated after approx 4 hrs without GI complaints and/or residuals < 500 ml, rec adv to next bolus of 125 ml (0.5 can) x 1 feeding and then goal of 1 can (250 ml) x 4 feedings daily.  Regimen of Nutren 1.5 with goal of 1 can x 4 feedings daily to provide 1000 ml/day, 1500 kcals (28 kcal/kg), 68 g PRO (1.3 g/kg/day), 760 ml H2O, 176 g CHO and no Fiber.  - Change H2O flushes to 90 ml H2O before and after each bolus QID (to provide an addtl 720 ml H2O) to meet est fld needs.            Request received from provider regarding adv TF to to goal rate over a shorter period of time in order to allow pt to discharge home later today. Per chart review and RN report, pt has been tolerating TF at rate of 30 ml/hr as of approximately 9:30 am today.  Intervention: Collaborated with RN, Care Coordinator and MD regarding above TF changes.   Tahmina Daniels RD,LD  Unit pager [phone]

## 2019-05-02 NOTE — PROGRESS NOTES
Patient was dressed in own clothes and lifted to wheel chair by her . Sent home with after visit summary and hard copy's of prescriptions. Patient requested to have prescriptions filled at her own pharmacy.  taught back bolus feeding through PEG tube prior to discharge. Education complete and all questions were answered. VSS and unchanged upon discharge.

## 2019-05-10 NOTE — TELEPHONE ENCOUNTER
RECORDS RECEIVED FROM: LEFT HIP PAIN    DATE RECEIVED: 5.14.19   NOTES STATUS DETAILS   OFFICE NOTE from referring provider Internal 5.8.19 Dr. Allan (telephone)  4.11.19 Dr. Allan   OFFICE NOTE from other specialist N/A    DISCHARGE SUMMARY from hospital Internal 4.30.19-5.1.19 [facility]   DISCHARGE REPORT from the ER N/A    OPERATIVE REPORT N/A    MEDICATION LIST Internal    IMPLANT RECORD/STICKER N/A    LABS     CBC/DIFF Internal/Care Everywhere 4.30.19, 3.13.19   CULTURES N/A    INJECTIONS DONE IN RADIOLOGY N/A    MRI N/A    CT SCAN N/A    XRAYS (IMAGES & REPORTS) N/A    TUMOR     PATHOLOGY  Slides & report N/A

## 2019-05-10 NOTE — PROGRESS NOTES
[facility] now requests orders and shares plan of care/discharge summaries for some patients through [employer].  Please REPLY TO THIS MESSAGE OR ROUTE BACK TO THE AUTHOR in order to give authorization for orders when needed.  This is considered a verbal order, you will still receive a faxed copy of orders for signature.  Thank you for your assistance in improving collaboration for our patients.    ORDER    Please fax a DME order to [employer], [phone], for B resting hand splints for spasticity.     Recommend Air Soft Resting Hand Splint R Medium #[id number] and L Medium #[id number].     Karey Mayen OTR/L  [phone]  [email or web address]

## 2019-05-14 NOTE — PROGRESS NOTES
Sports Medicine Clinic Visit    PCP: No Ref-Primary, Physician    Blanka Morton is a 58 year old female who is seen  in consultation at the request of  presenting with left leg spasm, contracture    Injury: fall on left hip at [place] 10/2018    Location of Pain: left hip/leg  Duration of Pain: 5 month(s)  Rating of Pain: LUL  Pain is better with: baclofen,tramadol  Pain is worse with: Unknown  Additional Features: She has ALS  Treatment so far consists of: Baclofen and tramadol, Occupational therapy   Prior History of related problems: NA    /83   Pulse 86   Resp 16          PMH:  Active problem list:  Patient Active Problem List   Diagnosis     S/P percutaneous endoscopic gastrostomy (PEG) tube placement (H)   ALS    FH:  No family history on file.    SH:  Social History     Socioeconomic History     Marital status:      Spouse name: Not on file     Number of children: Not on file     Years of education: Not on file     Highest education level: Not on file   Occupational History     Not on file   Social Needs     Financial resource strain: Not on file     Food insecurity:     Worry: Not on file     Inability: Not on file     Transportation needs:     Medical: Not on file     Non-medical: Not on file   Tobacco Use     Smoking status: Never Smoker     Smokeless tobacco: Never Used   Substance and Sexual Activity     Alcohol use: Never     Frequency: Never     Drug use: Never     Sexual activity: Not on file   Lifestyle     Physical activity:     Days per week: Not on file     Minutes per session: Not on file     Stress: Not on file   Relationships     Social connections:     Talks on phone: Not on file     Gets together: Not on file     Attends [employer] service: Not on file     Active member of club or organization: Not on file     Attends meetings of clubs or organizations: Not on file     Relationship status: Not on file     Intimate partner violence:     Fear of current or ex partner: Not  on file     Emotionally abused: Not on file     Physically abused: Not on file     Forced sexual activity: Not on file   Other Topics Concern     Not on file   Social History Narrative     Not on file       MEDS:  See EMR, reviewed  ALL:  See EMR, reviewed    REVIEW OF SYSTEMS:  CONSTITUTIONAL:NEGATIVE for fever, chills, change in weight  INTEGUMENTARY/SKIN: NEGATIVE for worrisome rashes, moles or lesions  EYES: NEGATIVE for vision changes or irritation  ENT/MOUTH: NEGATIVE for ear, mouth and throat problems  RESP:NEGATIVE for significant cough or SOB  BREAST: NEGATIVE for masses, tenderness or discharge  CV: NEGATIVE for chest pain, palpitations or peripheral edema  GI: NEGATIVE for nausea, abdominal pain, heartburn, or change in bowel habits  :NEGATIVE for frequency, dysuria, or hematuria  :NEGATIVE for frequency, dysuria, or hematuria  NEURO: NEGATIVE for weakness, dizziness or paresthesias  ENDOCRINE: NEGATIVE for temperature intolerance, skin/hair changes  HEME/ALLERGY/IMMUNE: NEGATIVE for bleeding problems  PSYCHIATRIC: NEGATIVE for changes in mood or affect      Subjective: This 58-year-old female with a history of ALS has been having recurrent discomfort when trying to position herself in bed at night with the help of her  for sleep.  She has spasm and contracture in the left lower extremity and in bed without proper positioning of the hip and knee she will indicates sharp positions of discomfort.  Does not seem to bother her when she is seated during the day.  She is nonverbal with her ALS.  Last month she has been placed on baclofen.  Flexeril needed to be discontinued because she did not tolerate the side effects.  Her  was wondering whether the trouble could be related to a fall that she had onto her left side about a year ago.  She has a remote history of a lumbar surgery.    Objective she appears to be sitting comfortably in a chair.  She is nonverbal.  She has significant contracture  at the left hip and left knee and ankle.  She does not tolerate flexion extension or abduction.  Strength testing is not able to be qualified in the left lower extremity.  She seems nontender over the greater trochanter or the sacroiliac joint on the left side.  Nontender directly over the distal IT band.  There is no effusion at the knee.    An x-ray of the hip shows no significant DJD and no bony abnormality.  She has some degenerative changes seen in the lumbar spine    Assessment ALS with contracture and spasm of the left lower extremity.    Plan: Her story does not seem consistent with sciatica.  They have a physical therapist who comes to their home who was taught them passive range of motion exercises for upper and lower extremities.  She has a pillow to assist with positions at night.  She has an upcoming visit with physical medicine to consider Botox injections.  They were reassured by an x-ray that did not show signs of a previous injury or significant DJD.  Will continue follow-up with her ALS providers.

## 2019-05-14 NOTE — LETTER
5/14/2019      RE: Blanka Morton  [address]  [place] [address]       Sports Medicine Clinic Visit    PCP: No Ref-Primary, Physician    Blanka Morton is a 58 year old female who is seen  in consultation at the request of  presenting with left leg spasm, contracture    Injury: fall on left hip at [place] 10/2018    Location of Pain: left hip/leg  Duration of Pain: 5 month(s)  Rating of Pain: LUL  Pain is better with: baclofen,tramadol  Pain is worse with: Unknown  Additional Features: She has ALS  Treatment so far consists of: Baclofen and tramadol, Occupational therapy   Prior History of related problems: NA    /83   Pulse 86   Resp 16          PMH:  Active problem list:  Patient Active Problem List   Diagnosis     S/P percutaneous endoscopic gastrostomy (PEG) tube placement (H)   ALS    FH:  No family history on file.    SH:  Social History     Socioeconomic History     Marital status:      Spouse name: Not on file     Number of children: Not on file     Years of education: Not on file     Highest education level: Not on file   Occupational History     Not on file   Social Needs     Financial resource strain: Not on file     Food insecurity:     Worry: Not on file     Inability: Not on file     Transportation needs:     Medical: Not on file     Non-medical: Not on file   Tobacco Use     Smoking status: Never Smoker     Smokeless tobacco: Never Used   Substance and Sexual Activity     Alcohol use: Never     Frequency: Never     Drug use: Never     Sexual activity: Not on file   Lifestyle     Physical activity:     Days per week: Not on file     Minutes per session: Not on file     Stress: Not on file   Relationships     Social connections:     Talks on phone: Not on file     Gets together: Not on file     Attends Restorationism service: Not on file     Active member of club or organization: Not on file     Attends meetings of clubs or organizations: Not on file     Relationship status: Not on file      Intimate partner violence:     Fear of current or ex partner: Not on file     Emotionally abused: Not on file     Physically abused: Not on file     Forced sexual activity: Not on file   Other Topics Concern     Not on file   Social History Narrative     Not on file       MEDS:  See EMR, reviewed  ALL:  See EMR, reviewed    REVIEW OF SYSTEMS:  CONSTITUTIONAL:NEGATIVE for fever, chills, change in weight  INTEGUMENTARY/SKIN: NEGATIVE for worrisome rashes, moles or lesions  EYES: NEGATIVE for vision changes or irritation  ENT/MOUTH: NEGATIVE for ear, mouth and throat problems  RESP:NEGATIVE for significant cough or SOB  BREAST: NEGATIVE for masses, tenderness or discharge  CV: NEGATIVE for chest pain, palpitations or peripheral edema  GI: NEGATIVE for nausea, abdominal pain, heartburn, or change in bowel habits  :NEGATIVE for frequency, dysuria, or hematuria  :NEGATIVE for frequency, dysuria, or hematuria  NEURO: NEGATIVE for weakness, dizziness or paresthesias  ENDOCRINE: NEGATIVE for temperature intolerance, skin/hair changes  HEME/ALLERGY/IMMUNE: NEGATIVE for bleeding problems  PSYCHIATRIC: NEGATIVE for changes in mood or affect      Subjective: This 58-year-old female with a history of ALS has been having recurrent discomfort when trying to position herself in bed at night with the help of her  for sleep.  She has spasm and contracture in the left lower extremity and in bed without proper positioning of the hip and knee she will indicates sharp positions of discomfort.  Does not seem to bother her when she is seated during the day.  She is nonverbal with her ALS.  Last month she has been placed on baclofen.  Flexeril needed to be discontinued because she did not tolerate the side effects.  Her  was wondering whether the trouble could be related to a fall that she had onto her left side about a year ago.  She has a remote history of a lumbar surgery.    Objective she appears to be sitting  comfortably in a chair.  She is nonverbal.  She has significant contracture at the left hip and left knee and ankle.  She does not tolerate flexion extension or abduction.  Strength testing is not able to be qualified in the left lower extremity.  She seems nontender over the greater trochanter or the sacroiliac joint on the left side.  Nontender directly over the distal IT band.  There is no effusion at the knee.    An x-ray of the hip shows no significant DJD and no bony abnormality.  She has some degenerative changes seen in the lumbar spine    Assessment ALS with contracture and spasm of the left lower extremity.    Plan: Her story does not seem consistent with sciatica.  They have a physical therapist who comes to their home who was taught them passive range of motion exercises for upper and lower extremities.  She has a pillow to assist with positions at night.  She has an upcoming visit with physical medicine to consider Botox injections.  They were reassured by an x-ray that did not show signs of a previous injury or significant DJD.  Will continue follow-up with her ALS providers.    Kannan Crowder MD

## 2019-05-22 NOTE — LETTER
"2019       RE: Blanka Morton  [address]  [place] [address]     Dear Colleague,    Thank you for referring your patient, Blanka Morton, to the [facility] PHYSICAL MEDICINE AND REHABILITATION at [facility]. Please see a copy of my visit note below.         PM&R Clinic Note   Patient Name: Blanka Morton : 1960 Medical Record: [id number]     Requesting Physician/clinician: No att. providers found         History of Present Illness:   Blanka Morton is a 59 year old right hand dominant female with ALSs which was diagnosed with May this year. Presents today at the request of Dr. Allan to address spasticity.  Non ambulatory and spends most of the day seating in her manual WC or bedside chair, non verbal but mostly reliable yes/no with nodding today. CPAP/biPAP nightly. Accompanied by her  David who assisted with the history.  Already on maximum dosing of baclofen (100mg PO daily).  Flexeril not helpful. Has not trial botox.      Currently, patient has moderate to severe waxing/waning pain in all extremities worse in her \"tighter\" limbs including the left UE, and both legs.  Worse in the evening or when lying in bed.   states getting progressively worse since her diagnosis. PEG tube recently and tolerating bolus feeds 4 times daily, tolerates minimal pleasure PO with thickened liquids and softer texture foods like pudding or ice cream.  briefs without obvious bowel or bladder concerns other than incontinence.     Spasticity has severely limited ability to provide all cares.  Right UE is not as affected likely due to handedness and continued minimal use.      Therapies/HEP, home bound with home health nurse, PT, OT, and SLP. Attending ALS clinic here at [facility] under the supervision of Dr. Allan (neurology)    Functionally, totally dependent for mobility and ADLs, mobility with sling type manual WC, awaiting power chair.   doing all transfers but this is becoming more and more " difficult for him.  Offered lift in past which they refused.  No recent falls. Not driving.          Past Medical and Surgical History:   History reviewed. No pertinent past medical history.  Past Surgical History:   Procedure Laterality Date     IR GASTROSTOMY TUBE PERCUTANEOUS PLCMNT  4/30/2019            Social History:     Marital Status:   David  Living situation: formerly in 5th wheel trailer full time. Now in accessible hotel room until more permanent living quarters are made.    Family support: two grown daughters in the area, one is rep for cosmetic botox.  Good support per .   Vocational History: housewife  Tobacco use: remote  Alcohol use: denies   Recreational drug use: denies         Functional history:   See above - totally dependent mobility and ADLs           Family History:   History reviewed. No pertinent family history.         Medications:     Current Outpatient Medications   Medication Sig Dispense Refill     acetaminophen (TYLENOL) 325 MG tablet Take 1-2 tablets (325-650 mg) by mouth nightly as needed for mild pain 30 tablet 0     artificial tears OINT ophthalmic ointment Place 1 g into both eyes At Bedtime 1 Tube 3     baclofen (LIORESAL) 10 MG tablet Take 3 tablets in the AM, 3 tablets in the PM and 4 tablets every evening by mouth or feeding tube. 120 tablet 3     dextromethorphan-quiNIDine (NUEDEXTA) 20-10 MG capsule 1 capsule by Oral or Feeding Tube route every 12 hours 30 capsule 1     escitalopram (LEXAPRO) 10 MG tablet 1 tablet (10 mg) by Oral or Feeding Tube route daily 30 tablet 1     glycopyrrolate (ROBINUL) 1 MG tablet 2 tablets (2 mg) by Oral or Feeding Tube route as needed 30 tablet 1     losartan (COZAAR) 50 MG tablet 1 tablet (50 mg) by Oral or Feeding Tube route daily 30 tablet 1     multivitamins w/minerals (CERTAVITE) liquid 15 mLs by Per Feeding Tube route daily 1 Bottle 3     order for DME Gravity Tube Feeding & Supplies    Nutren 1.5, 4 cans per  "day  [place] feeding bags  60cc cath tip syringes  IV pole    Diagnosis:  Oropharyngeal dysphagia; ALS 1 Month 11     riluzole (RILUTEK) 50 MG tablet 1 tablet (50 mg) by Oral or Feeding Tube route every 12 hours 60 tablet 3     traMADol (ULTRAM) 50 MG tablet Take one tablet in the afternoon and one at bedtime. 60 tablet 3            Allergies:   No Known Allergies           ROS:   A focused ROS is negative other than the symptoms noted above in the HPI.    Constitutional: denies any fevers, chills, any recent weight loss   Eyes: denies changes in visual acuity  Ears, Nose, Throat: denies any worsening dyphasia  Cardiovascular: denies any exertional chest pain or palpitation   Respiratory: denies dyspnea during the day.   Gastrointestinal: denies any nausea, vomiting, abdominal pain, diarrhea or constipation.   Genitourinary: denies any dysuria, hematuria, frequency or urgency.   Musculoskeletal: denies joint pain.   Neurologic: denies any headache  Psychiatric: denies mood changes         Physical Examiniation:   VITAL SIGNS: /84   Pulse 83   Temp 97.4  F (36.3  C) (Oral)   Resp 15   Ht 1.651 m (5' 5\")   Wt 54.4 kg (120 lb)   SpO2 96%   BMI 19.97 kg/m     BMI: Estimated body mass index is 19.97 kg/m  as calculated from the following:    Height as of this encounter: 1.651 m (5' 5\").    Weight as of this encounter: 54.4 kg (120 lb).    Gen: NAD, pleasant and cooperative   HEENT: Lids and lashes normal, pupils equal, round and reactive to light, extra ocular muscles intact, sclera clear, conjunctiva normal  Cardio: regular pulse  Pulm: non-labored breathing on room air  Abd: benign  Ext: b/l dependent LE edema  Neuro/MSK:    Strength: some antigravity movement right UE with 4/5 bicep/tricep, active right shoudler abduction to ~90 degrees. No movement other limbs today. Crouched position in WC today, LE quite adducted b/l.     Tone:     Right UE: 1+ elbow ext, remainder right UE 2, positive morfin's. "     Left UE: 2-3 throughout, worse at Pectoralis     Right LE: 3-4 throughout with about 20 degrees knee flexion contracture    Left LE: 3-4 throughout with about 30 degrees knee flexion contracture.            Laboratory/Imaging:   Xray pelvis 5/14  Impression:  1. No acute osseous abnormality.  2. No substantial degenerative change of either hip.  3. Prominent degenerative changes of lower lumbar spine, especially  involving facet joints.          Assessment/Plan:   59 year old, right hand dominant female with hx ALS diagnosed March this year, presents at the request of Dr. Allan to evaluate and make recommendations regarding ongoing spasticity.   Totally dependent for mobility and ALSs. Had long discussion about best approach for what appears to be rapidly progressing symptoms,  Spasticity is significant, very painful and making it difficult to provide cares.  botox has not been effective and not candidate for a pump.  Despite her more diffuse dz would benefit from trial of botox - and plan to target left upper extremity, mostly pectoralis and well as both LE adductors and hamstrings to start.     1. Patient education: In depth discussion and education was provided about the assessment and implications of each of the below recommendations for management. Patient indicated readiness to learn, all questions were answered and understanding of material presented was confirmed.  2. Work-up: none   3. Therapy/equipment/braces: waiting on hand orthosis per    4. Medications: no changes today. Endorse trial of other oral muscle relaxers.   5. Interventions: will schedule intramuscular injections botox  6. Referral - none. Already seen at ALS clinic   7. Follow up: will proceed with botox once pre authorization accomplished.     Rodolfo Sims DO  PGY4  Physical Medicine & Rehabilitation    Staffed with Dr. Dominique (PM&R)    Physician Attestation   I, Kayden Dominique, saw this patient and agree with the findings and  plan of care as documented in the note.    Items personally reviewed/procedural attestation: Chart, vitals, labs, imaging and agree with the interpretation documented in the note.  Patient unfortunately diagnosed with ALS in March of this year, and seems to be progressing rapidly.  Has significant spasticity in all extremities which is causing considerable discomfort and difficulty with transfers and ADLs.  We discussed spasticity options.  She is on 100 mg daily of Baclofen per day which is already above the FDA recommended maximum, and without much effect.  Could add a second agent potentially as an option.  We also discussed Botox injections.  We would need to focus the injections to the most problematic areas as her significant and diffuse spasticity would require much more Botox than we are able to give.  After discussion, we elected to administer Botox into the left pectoralis and bilateral adductors and hamstrings.  Will need insurance authorization first.  From strictly a spasticity standpoint alone, a pump would likely be the most beneficial in managing this, however given her ALS which seems to be rapidly progressing, one would need to consider her overall prognosis and extremely high risk of surgery and therefore likely would not be a candidate for one.     Kayden Dominique MD

## 2019-05-22 NOTE — NURSING NOTE
Chief Complaint   Patient presents with     Consult     [facility] NEW CONSULT FOOR SPASTICITY       Sixto Dickson, EMT

## 2019-05-22 NOTE — PROGRESS NOTES
"       PM&R Clinic Note   Patient Name: Blanka Morton : 1960 Medical Record: [id number]     Requesting Physician/clinician: No att. providers found           History of Present Illness:   Blanka Morton is a 59 year old right hand dominant female with ALSs which was diagnosed with May this year. Presents today at the request of Dr. Allan to address spasticity.  Non ambulatory and spends most of the day seating in her manual WC or bedside chair, non verbal but mostly reliable yes/no with nodding today. CPAP/biPAP nightly. Accompanied by her  David who assisted with the history.  Already on maximum dosing of baclofen (100mg PO daily).  Flexeril not helpful. Has not trial botox.      Currently, patient has moderate to severe waxing/waning pain in all extremities worse in her \"tighter\" limbs including the left UE, and both legs.  Worse in the evening or when lying in bed.   states getting progressively worse since her diagnosis. PEG tube recently and tolerating bolus feeds 4 times daily, tolerates minimal pleasure PO with thickened liquids and softer texture foods like pudding or ice cream.  briefs without obvious bowel or bladder concerns other than incontinence.     Spasticity has severely limited ability to provide all cares.  Right UE is not as affected likely due to handedness and continued minimal use.      Therapies/HEP, home bound with home health nurse, PT, OT, and SLP. Attending ALS clinic here at [facility] under the supervision of Dr. Allan (neurology)    Functionally, totally dependent for mobility and ADLs, mobility with sling type manual WC, awaiting power chair.   doing all transfers but this is becoming more and more difficult for him.  Offered lift in past which they refused.  No recent falls. Not driving.              Past Medical and Surgical History:   History reviewed. No pertinent past medical history.  Past Surgical History:   Procedure Laterality Date     IR GASTROSTOMY TUBE " PERCUTANEOUS PLCMNT  4/30/2019            Social History:     Marital Status:   David  Living situation: formerly in 5th wheel trailer full time. Now in accessible hotel room until more permanent living quarters are made.    Family support: two grown daughters in the area, one is rep for cosmetic botox.  Good support per .   Vocational History: housewife  Tobacco use: remote  Alcohol use: denies   Recreational drug use: denies         Functional history:   See above - totally dependent mobility and ADLs           Family History:   History reviewed. No pertinent family history.         Medications:     Current Outpatient Medications   Medication Sig Dispense Refill     acetaminophen (TYLENOL) 325 MG tablet Take 1-2 tablets (325-650 mg) by mouth nightly as needed for mild pain 30 tablet 0     artificial tears OINT ophthalmic ointment Place 1 g into both eyes At Bedtime 1 Tube 3     baclofen (LIORESAL) 10 MG tablet Take 3 tablets in the AM, 3 tablets in the PM and 4 tablets every evening by mouth or feeding tube. 120 tablet 3     dextromethorphan-quiNIDine (NUEDEXTA) 20-10 MG capsule 1 capsule by Oral or Feeding Tube route every 12 hours 30 capsule 1     escitalopram (LEXAPRO) 10 MG tablet 1 tablet (10 mg) by Oral or Feeding Tube route daily 30 tablet 1     glycopyrrolate (ROBINUL) 1 MG tablet 2 tablets (2 mg) by Oral or Feeding Tube route as needed 30 tablet 1     losartan (COZAAR) 50 MG tablet 1 tablet (50 mg) by Oral or Feeding Tube route daily 30 tablet 1     multivitamins w/minerals (CERTAVITE) liquid 15 mLs by Per Feeding Tube route daily 1 Bottle 3     order for DME Gravity Tube Feeding & Supplies    Nutren 1.5, 4 cans per day  [place] feeding bags  60cc cath tip syringes  IV pole    Diagnosis:  Oropharyngeal dysphagia; ALS 1 Month 11     riluzole (RILUTEK) 50 MG tablet 1 tablet (50 mg) by Oral or Feeding Tube route every 12 hours 60 tablet 3     traMADol (ULTRAM) 50 MG tablet Take one tablet  "in the afternoon and one at bedtime. 60 tablet 3            Allergies:   No Known Allergies           ROS:   A focused ROS is negative other than the symptoms noted above in the HPI.      Constitutional: denies any fevers, chills, any recent weight loss   Eyes: denies changes in visual acuity  Ears, Nose, Throat: denies any worsening dyphasia  Cardiovascular: denies any exertional chest pain or palpitation   Respiratory: denies dyspnea during the day.   Gastrointestinal: denies any nausea, vomiting, abdominal pain, diarrhea or constipation.   Genitourinary: denies any dysuria, hematuria, frequency or urgency.   Musculoskeletal: denies joint pain.   Neurologic: denies any headache  Psychiatric: denies mood changes             Physical Examiniation:   VITAL SIGNS: /84   Pulse 83   Temp 97.4  F (36.3  C) (Oral)   Resp 15   Ht 1.651 m (5' 5\")   Wt 54.4 kg (120 lb)   SpO2 96%   BMI 19.97 kg/m    BMI: Estimated body mass index is 19.97 kg/m  as calculated from the following:    Height as of this encounter: 1.651 m (5' 5\").    Weight as of this encounter: 54.4 kg (120 lb).    Gen: NAD, pleasant and cooperative   HEENT: Lids and lashes normal, pupils equal, round and reactive to light, extra ocular muscles intact, sclera clear, conjunctiva normal  Cardio: regular pulse  Pulm: non-labored breathing on room air  Abd: benign  Ext: b/l dependent LE edema  Neuro/MSK:    Strength: some antigravity movement right UE with 4/5 bicep/tricep, active right shoudler abduction to ~90 degrees. No movement other limbs today. Crouched position in WC today, LE quite adducted b/l.     Tone:     Right UE: 1+ elbow ext, remainder right UE 2, positive morfin's.     Left UE: 2-3 throughout, worse at Pectoralis     Right LE: 3-4 throughout with about 20 degrees knee flexion contracture    Left LE: 3-4 throughout with about 30 degrees knee flexion contracture.            Laboratory/Imaging:   Xray pelvis 5/14  Impression:  1. No acute " osseous abnormality.  2. No substantial degenerative change of either hip.  3. Prominent degenerative changes of lower lumbar spine, especially  involving facet joints.              Assessment/Plan:   59 year old, right hand dominant female with hx ALS diagnosed March this year, presents at the request of Dr. Allan to evaluate and make recommendations regarding ongoing spasticity.   Totally dependent for mobility and ALSs. Had long discussion about best approach for what appears to be rapidly progressing symptoms,  Spasticity is significant, very painful and making it difficult to provide cares.  botox has not been effective and not candidate for a pump.  Despite her more diffuse dz would benefit from trial of botox - and plan to target left upper extremity, mostly pectoralis and well as both LE adductors and hamstrings to start.     1. Patient education: In depth discussion and education was provided about the assessment and implications of each of the below recommendations for management. Patient indicated readiness to learn, all questions were answered and understanding of material presented was confirmed.  2. Work-up: none   3. Therapy/equipment/braces: waiting on hand orthosis per    4. Medications: no changes today. Endorse trial of other oral muscle relaxers.   5. Interventions: will schedule intramuscular injections botox  6. Referral - none. Already seen at ALS clinic   7. Follow up: will proceed with botox once pre authorization accomplished.     Rodolfo Sims, DO  PGY4  Physical Medicine & Rehabilitation    Staffed with Dr. Dominique (PM&R)    Physician Attestation   I, Kayden Dominique, saw this patient and agree with the findings and plan of care as documented in the note.      Items personally reviewed/procedural attestation: Chart, vitals, labs, imaging and agree with the interpretation documented in the note.  Patient unfortunately diagnosed with ALS in March of this year, and seems to be  progressing rapidly.  Has significant spasticity in all extremities which is causing considerable discomfort and difficulty with transfers and ADLs.  We discussed spasticity options.  She is on 100 mg daily of Baclofen per day which is already above the FDA recommended maximum, and without much effect.  Could add a second agent potentially as an option.  We also discussed Botox injections.  We would need to focus the injections to the most problematic areas as her significant and diffuse spasticity would require much more Botox than we are able to give.  After discussion, we elected to administer Botox into the left pectoralis and bilateral adductors and hamstrings.  Will need insurance authorization first.  From strictly a spasticity standpoint alone, a pump would likely be the most beneficial in managing this, however given her ALS which seems to be rapidly progressing, one would need to consider her overall prognosis and extremely high risk of surgery and therefore likely would not be a candidate for one.     Kayden Dominique MD

## 2019-05-29 NOTE — TELEPHONE ENCOUNTER
David called because of excess thick secretions. No coughing. Not entirely clear whether it is mucus or saliva but sounds thicker than saliva. SLP was in today but they did not discuss it with her.    Recommendations:    1. Reduce robinul to 1 pill twice daily.  2. Get Guafenisen.  3. I will order a suction device.  4. I will ask Celine Mendosa if she noticed this and if so whether she feels it represents phlegm or saliva.    I did not mention a cough assist as I think she has one, but if not she will benefit from that as well.

## 2019-06-03 NOTE — TELEPHONE ENCOUNTER
SLP reported that she saw no excess of saliva at her visit last week, nor did the patient or  note any new difficulty with swallowing. Hence I feel it is best not to increase her anticholinergics.

## 2019-06-07 NOTE — NURSING NOTE
Chief Complaint   Patient presents with     Als     [facility] RETURN BOTOX, ALS       Sixto Dickson, EMT

## 2019-06-07 NOTE — PROGRESS NOTES
PM&R Clinic Procedure Note: Botox Injection (Extremities)  : 6/7/2019    Blanka Morton  [id number]     Diagnosis: ALS, spastic quadriparesis  Date of Last Botox Injections: N/A  The patient presents today for management of spasticity.  I have evaluated the patient and deemed her  an appropriate candidate for Botox injections.  Spasticity related symptoms and functional limitations were reviewed.  The patient is Botox naive.  Current anti-spasticity medications and physical measures were reviewed as appropriate and are optimal for this patients individual circumstances, yet are ineffective in addressing the spasticity related problems.  The affected extremities were examined for degree of spasticity, functional use/potential, joint contracture/ROM and related skin problems/risks.   MAS 4 to L pectoralis, EF, b/l adductors, and b/l hamstrings.  Possible side effects include but not limited to infection, bleeding/hematoma, local reaction, pain, nerve or vascular injury, compartment syndrome, distant effects including dysphagia, and hypotonia were discussed with the patient who understood and agreed to proceed, and informed consent was obtained. The proposed plan of Botox (brand) injections as outlined and performed below. (muscle/# of sites/ total units that muscle). EMG guidance was used to assure injection of Botox into optimal locations within the targeted muscles. Unless commented on, there were no complications or change in the patients prior rehabilitation plan. Aseptic precautions were followed.      Comments:  RTC 3 months for re-evaluation and repeat.       On antibiotics: No  Current illness/fevers: No  Antiplatelet/Anticoagulation: No  Lower Extremities  Muscle Injected Sites Total Amount   Right Adductors 4 100   Right Hamstring 3 75   Left Adductors 4 100   Left Hamstring 3 75        Total  = 350  Upper Extremities  Left Pectoralis Major 2 50        Total = 50      Grand Total = 400 units  Lot Number:  [id number]  Expiry: 10/2021  NDC Number: 6668-4340-98  Total prepared: 400 units  Amt. Discarded appropriately: 0 units       Injection Code Description   X 53973 Ext, 1-4 muscles   X2 62037 EA ext(s), 1-4 muscles    04230 Ext, 5+ muscles    98004 EA ext(s), 5+ muscles    25597 Neck    19723 Face, eye    25056 Trunk    50 modifier Bilateral face, neck   X 00846 EMG guidance     Procedure was performed by Benji Harris, PGY-4, under the direct supervision of myself.   Sandro Dominique MD  Department of Rehabilitation Medicine  Pager: [phone]

## 2019-06-07 NOTE — LETTER
6/7/2019       RE: Blanka Morton  [address]  [address]  [facility] [address]     Dear Colleague,    Thank you for referring your patient, Blanka Morton, to the [facility] PHYSICAL MEDICINE AND REHABILITATION at [facility]. Please see a copy of my visit note below.    PM&R Clinic Procedure Note: Botox Injection (Extremities)  : 6/7/2019    Blanka Morton  [id number]     Diagnosis: ALS, spastic quadriparesis  Date of Last Botox Injections: N/A  The patient presents today for management of spasticity.  I have evaluated the patient and deemed her  an appropriate candidate for Botox injections.  Spasticity related symptoms and functional limitations were reviewed.  The patient is Botox naive.  Current anti-spasticity medications and physical measures were reviewed as appropriate and are optimal for this patients individual circumstances, yet are ineffective in addressing the spasticity related problems.  The affected extremities were examined for degree of spasticity, functional use/potential, joint contracture/ROM and related skin problems/risks.   MAS  4 to L pectoralis, EF, b/l adductors, and b/l hamstrings.  Possible side effects include but not limited to infection, bleeding/hematoma, local reaction, pain, nerve or vascular injury, compartment syndrome, distant effects including dysphagia, and hypotonia were discussed with the patient who understood and agreed to proceed, and informed consent was obtained. The proposed plan of Botox (brand) injections as outlined and performed below. (muscle/# of sites/ total units that muscle). EMG guidance was used to assure injection of Botox into optimal locations within the targeted muscles. Unless commented on, there were no complications or change in the patients prior rehabilitation plan. Aseptic precautions were followed.  Comments:  RTC 3 months for re-evaluation and repeat.       On antibiotics: No  Current illness/fevers:  No  Antiplatelet/Anticoagulation: No  Lower Extremities  Muscle Injected Sites Total Amount   Right Adductors 4 100   Right Hamstring 3 75   Left Adductors 4 100   Left Hamstring 3 75        Total  = 350  Upper Extremities  Left Pectoralis Major 2 50        Total = 50      Grand Total = 400 units  Lot Number: [id number]  Expiry: 10/2021  NDC Number: 7536-8406-43  Total prepared: 400 units  Amt. Discarded appropriately: 0 units       Injection Code Description   X 28165 Ext, 1-4 muscles   X2 17581 EA ext(s), 1-4 muscles    48827 Ext, 5+ muscles    23217 EA ext(s), 5+ muscles    71089 Neck    78723 Face, eye    29241 Trunk    50 modifier Bilateral face, neck   X 59230 EMG guidance     Procedure was performed by Benji Harris, PGY-4, under the direct supervision of myself.   Sandro Dominique MD  Department of Rehabilitation Medicine  Pager: [phone]

## 2019-06-20 NOTE — PROGRESS NOTES
"CLINICAL NUTRITION SERVICES - ASSESSMENT NOTE    Blanka Morton 59 year old referred for MNT related to ALS    Visit Type: initial  Referring Physician: Jerrell  Pt accompanied by: her , David    NUTRITION HISTORY  Factors affecting nutrition intake include: dyspahgaia  Current diet: soft foods (pudding, ice cream, yogurt and popsicles) - small bites only  PEG Tube: Yes, placed on 4/30 - dependent    Current EN regimen:  Formula: Isosource 1.5 cl  Volume: 4 cartons/day = 1000mL, 760mL free water, 1500kcal (27kcal/kg), 68g protein (1.2g/kg), 15g fiber daily  Feeding type: gravity/bolus feedings (am 1 1/2 cartons; noon 1 carton; pm 1 1/2 cartons)  Fluids: 90mL water before and after each feeding + 120mL with medications HS.   EN fluids = 1420mL (not including PO fluids)  PO fluids: drinking water, 7-up and ice green tea (1-2 cups/day)    ANTHROPOMETRICS  Height: 65\"  Weight: 121 lbs/55kg  BMI: 19  Weight Status:  Normal BMI  IBW: 125 lbs  % IBW: 93%  Weight History: up 4 lb x past 2 weeks.  Wt Readings from Last 8 Encounters:   06/20/19 55 kg (121 lb 3.2 oz)   06/07/19 53.1 kg (117 lb)   05/22/19 54.4 kg (120 lb)   04/30/19 54 kg (119 lb)   04/11/19 54.3 kg (119 lb 12.8 oz)       ALS FRS-6 (Modification of H-B equations for ALS)   BMR = 1203 Kcal/day   1 - Speech Score - 0   4 - Handwriting -  0   6 - Dressing and Hygiene -  0   7 - Turning in Bed - 0   8 - Walking - 0   10 a - Dyspnea - 4   Total ALS FRS 6 score =  4   Kcal needs per ALS- FRS = 1258kcal       Medications/vitamins/minerals/herbals:   Reviewed    LABS  Labs reviewed    ASSESSED NUTRITION NEEDS:  Estimated Energy Needs: 1600 kcals (30 Kcal/Kg)  Justification: maintenance/ALS  Estimated Protein Needs: 60 grams protein (1.2 g pro/Kg)  Justification: maintenance  Estimated Fluid Needs: 1500  mL   Justification: 1mL/kcal    MALNUTRITION:  % Weight Loss:  None noted  % Intake:  No decreased intake noted  Subcutaneous Fat Loss:  None observed  Muscle Loss:  " Temporal region mild depletion and Anterior thigh region mild depletion  Fluid Retention:  None noted    Malnutrition Diagnosis: Patient does not meet two of the above criteria necessary for diagnosing malnutrition but is at risk    NUTRITION DIAGNOSIS:  Inadequate oral intake related to dysphagia as evidenced by pt dependent on EN via PEG tube to meet 100% of estimated nutrition needs.     INTERVENTIONS  Nutrition Education     Provided written & verbal education on:   - Reviewed current EN regimen (formula, feeding type, fluids etc).  Recommend continue current regimen as this provides her with 100% of estimated nutrition and hydration needs.   - Reviewed PO intake: PO intake per SLP.      Goals  1.  EN & PEG to provide 100% of estimated nutrition needs and hydration needs  2. Weight maintenance      Follow-Up Plans: Pt has RD contact information for questions.    Pt encouraged to follow up with RD at next clinic visit in 3-6 months.    MONITORING AND EVALUATION:  -EN intake  -Weight trends    Staci Dominique, LORYN, LD

## 2019-06-20 NOTE — LETTER
2019       RE: Blanka Morton  [address]  [address]  [facility] [address]     Dear Colleague,    Thank you for referring your patient, Blanka Morton, to the [facility] NEUROLOGY at [facility]. Please see a copy of my visit note below.      DEPARTMENT OF NEUROLOGY  ALS CLINIC FOLLOW UP    Patient Name:  Blanka Morton  MRN:  [id number]    :  1960  Date of Clinic Visit:  2019  Primary Care Provider:  No Ref-Primary, Physician        SUMMARY: Blanka Morton is a 59 year old woman who follows in ALS clinic for bulbar-onset ALS with known [id number] mutation and family history ALS.     INTERVAL HISTORY: Ms. Morton presents to clinic with her , she was last seen in ALS clinic on 19. Since the last visit she was admitted to the hospital for PEG tube insertion (discharged 19). PEG was inserted without complications. Patient and her  have not had any issues with her PEG and use of this is going well. She continues to have significant secretions that are described as thick and mucinous. They recently moved into senior living but do not receive any assisted living services.    She has issues with pain in her left shoulder. This has been longstanding and she was seen in PMR clinic for treatment of spasticity with botox. This was 13 days ago and she received injections in the left shoulder and bilateral lower extremities. They have not seen much of a benefit from this treatment yet, although maybe some improvement in the RLE.     She has had some trouble using cough assist in that she can't quite get the timing down right. She feels that her breathing is overall stable. She sleeps in a recliner with her legs propped up.     Her  helps her with transfers. He states that he makes 10-12 transfers per day which can be tiring and is inquiring about a lift. In addition they are requesting an electric wheelchair.    Vital signs:      BP: 133/83 Pulse: 88     SpO2:  "95 %       Weight: 55 kg (121 lb 3.2 oz)  Estimated body mass index is 20.17 kg/m  as calculated from the following:    Height as of 6/7/19: 1.651 m (5' 5\").    Weight as of this encounter: 55 kg (121 lb 3.2 oz).    Examination:  General: NAD  HEENT: sclera anicteric  Resp: breathing comfortably on room air, no use of accessory muscles  Skin: warm and dry  Extremities: 2-3+ pitting edema in the b/l LE    Neuro:  Mental Status: Fully alert, attentive.   Cranial Nerves: EOMI. Profound facial weakness. Hearing intact to conversation. Tongue fasciculations present. Severe dysarthria to the point of not being able to speak;  answers most questions.   Motor: Neuromuscular exam as follows:    NM Exam: Cranial      Atrophy:  Fasciculations:    Upper face:      Lower face:      Tongue:   Pos          Facial weakness: severe   Tongue movements: slow   Tongue weakness: severe   Speech: anarthria   Pseudobulbar affect: absent     NM Exam: Upper Extremities      Right:  Left:    Atrophy:      Fasciculations:      Muscle tone:  spastic catch spastic catch   Rapid alt. movements:  unable to perform unable to perform       Reflexes Right:  Left:    Biceps:  increased increased   Brachioradialis:  increased increased   Triceps:      [place]:          Strength Right:  Left:    Shoulder abduction*:  4- 1   Elbow flexion:  4- 2   Elbow extension*:  4- 2   Wrist extension*:  1 0   Finger extension:      Finger flexion:  2 1   Abductor pollicis brevis:  1 1   Abductor digiti minimi:      First dorsal interosseous*:  2 1     NM Exam: Lower Extremities      Right:  Left:    Atrophy:      Fasciculations:      Muscle tone:  right lower extremity stiffness left lower extremity stiffness   Rapid alt. movements:          Reflexes Right:  Left:    Patellar:  increased increased   Achilles:  increased increased   Plantar:          Strength Right:  Left:    Hip flexion*:  0 0   Hip extension:      Hip adduction:      Hip abduction:    "   Knee extension*:  0 0   Knee flexion:      Ankle dorsiflexion*:  0 0   Ankle plantar flexion:  0 0       Station/Gait: Uses wheelchair, unable to walk    INVESTIGATIONS:  All available and relevant labs, imaging, and other procedures were reviewed personally.     PFT Latest Ref Rng & Units 6/20/2019   FVC L 0.83   FEV1 L 0.83   FVC% % 26   FEV1% % 33         IMPRESSION/RECOMMENDATIONS:     #Bulbar-onset ALS:  Ms. Morton is a 59 year old woman with bulbar-onset ALS in the setting of family history of ALS with known [id number] mutation. Symptoms have progressed since last time she was seen. PFTs were done today and are quite low, although suspect this is due to inability to form a seal and conduct this test properly as she is not reporting subjective worsening and there is no evidence of accessory muscle use or increased respiratory rate today. She is having trouble with cough assist. Based upon her appropriate responses to questions we do feel she is competent to make decisions for her care; that said, however, it is possible that some of the difficulty complying with cough assist is due to difficulty complying with instructions, in addition to difficulty with facial weakness and coordination of respiratory musculature. She continues to have difficulty with secretions, although these sound mucinous and not like saliva. Will therefore stop glycopyrrolate as this could dry out these secretions, causing more issues. If drooling comes back then she can restart glycopyrrolate at a lower dose (1 tab BID). We also discussed options with RT, who recommended redoubling efforts to use cough assist and add oscillation mode. We will also add nebs.  Patient advised to discontinue glycopyrrolate, can restart at lower dose (1mg BID) if drooling worsens.    #Left shoulder pain:  This likely is related to spasticity. Should is not tender on exam and there is minimal concern there is an orthopedic issue going on, however she has the  option of going to ortho walk in clinic at [facility] today if she wishes further investigation and this was offered. For spasticity will increase baclofen to 40mg TID and continue botox at higher doses. Will also increase tramadol to 50mg TID.    #Advanced care planning:  Began discussion of end of life care today. In an extensive conversation she indicated that she would not want to use invasive mechanical ventilation to prolong life in the context of ventilatory failure without the possibility of recovery. Insofar as this was an initial discussion, however, without the option to express herself beyond nodding, we recommended that she and her family discuss this further at home and take sufficient time to be certain. She will be provided an advance directive form and a copy of a POLST so that she can review both.    RETURN TO CLINIC: 3 months    Patient care discussed with attending neurologist, Dr. Allan, who agrees with my assessment and plan.    Gio Lew MD  Neurology PGY3    I personally examined the patient and concur with the resident's note.    Lew Allan M.D.

## 2019-06-20 NOTE — PATIENT INSTRUCTIONS
"1. Stop the medication for drooling (glycopyrrholate). If drooling comes back, re-start at 1 pill twice a day.  2. We will ask [employer] to supply a cough assist with an \"oscillation\" mode if possible and to review its use with you.  3. I'll order a nebulizer to use before using cough assist as well. [employer] can show you how to use this.  4. I think the left shoulder pain is mostly due to muscle stiffness, but you can go to the orthopaedic \"walk-in clinic\" upstairs if you would like their opinion about an injection.   5. I'll increase baclofen to 40 mg 3 times a day. I'll check with Dr Dominique about this too.  6. It's OK to take an extra tramadol mid-day when needed for pain.  7. I encourage you to review the advance directive and POLST form and feel free to discuss with us.     "

## 2019-06-20 NOTE — PROGRESS NOTES
"  DEPARTMENT OF NEUROLOGY  ALS CLINIC FOLLOW UP    Patient Name:  Blanka Morton  MRN:  [id number]    :  1960  Date of Clinic Visit:  2019  Primary Care Provider:  Nikki Ref-Primary, Physician        SUMMARY: Blanka Morton is a 59 year old woman who follows in ALS clinic for bulbar-onset ALS with known [id number] mutation and family history ALS.     INTERVAL HISTORY: Ms. Morton presents to clinic with her , she was last seen in ALS clinic on 19. Since the last visit she was admitted to the hospital for PEG tube insertion (discharged 19). PEG was inserted without complications. Patient and her  have not had any issues with her PEG and use of this is going well. She continues to have significant secretions that are described as thick and mucinous. They recently moved into senior living but do not receive any assisted living services.    She has issues with pain in her left shoulder. This has been longstanding and she was seen in PMR clinic for treatment of spasticity with botox. This was 13 days ago and she received injections in the left shoulder and bilateral lower extremities. They have not seen much of a benefit from this treatment yet, although maybe some improvement in the RLE.     She has had some trouble using cough assist in that she can't quite get the timing down right. She feels that her breathing is overall stable. She sleeps in a recliner with her legs propped up.     Her  helps her with transfers. He states that he makes 10-12 transfers per day which can be tiring and is inquiring about a lift. In addition they are requesting an electric wheelchair.    Vital signs:      BP: 133/83 Pulse: 88     SpO2: 95 %       Weight: 55 kg (121 lb 3.2 oz)  Estimated body mass index is 20.17 kg/m  as calculated from the following:    Height as of 19: 1.651 m (5' 5\").    Weight as of this encounter: 55 kg (121 lb 3.2 oz).    Examination:  General: NAD  HEENT: sclera anicteric  Resp: " breathing comfortably on room air, no use of accessory muscles  Skin: warm and dry  Extremities: 2-3+ pitting edema in the b/l LE    Neuro:  Mental Status: Fully alert, attentive.   Cranial Nerves: EOMI. Profound facial weakness. Hearing intact to conversation. Tongue fasciculations present. Severe dysarthria to the point of not being able to speak;  answers most questions.   Motor: Neuromuscular exam as follows:    NM Exam: Cranial      Atrophy:  Fasciculations:    Upper face:      Lower face:      Tongue:   Pos          Facial weakness: severe   Tongue movements: slow   Tongue weakness: severe   Speech: anarthria   Pseudobulbar affect: absent     NM Exam: Upper Extremities      Right:  Left:    Atrophy:      Fasciculations:      Muscle tone:  spastic catch spastic catch   Rapid alt. movements:  unable to perform unable to perform       Reflexes Right:  Left:    Biceps:  increased increased   Brachioradialis:  increased increased   Triceps:      [place]:          Strength Right:  Left:    Shoulder abduction*:  4- 1   Elbow flexion:  4- 2   Elbow extension*:  4- 2   Wrist extension*:  1 0   Finger extension:      Finger flexion:  2 1   Abductor pollicis brevis:  1 1   Abductor digiti minimi:      First dorsal interosseous*:  2 1     NM Exam: Lower Extremities      Right:  Left:    Atrophy:      Fasciculations:      Muscle tone:  right lower extremity stiffness left lower extremity stiffness   Rapid alt. movements:          Reflexes Right:  Left:    Patellar:  increased increased   Achilles:  increased increased   Plantar:          Strength Right:  Left:    Hip flexion*:  0 0   Hip extension:      Hip adduction:      Hip abduction:      Knee extension*:  0 0   Knee flexion:      Ankle dorsiflexion*:  0 0   Ankle plantar flexion:  0 0       Station/Gait: Uses wheelchair, unable to walk    INVESTIGATIONS:  All available and relevant labs, imaging, and other procedures were reviewed personally.     PFT Latest Ref  Rng & Units 6/20/2019   FVC L 0.83   FEV1 L 0.83   FVC% % 26   FEV1% % 33         IMPRESSION/RECOMMENDATIONS:     #Bulbar-onset ALS:  Ms. Morton is a 59 year old woman with bulbar-onset ALS in the setting of family history of ALS with known [id number] mutation. Symptoms have progressed since last time she was seen. PFTs were done today and are quite low, although suspect this is due to inability to form a seal and conduct this test properly as she is not reporting subjective worsening and there is no evidence of accessory muscle use or increased respiratory rate today. She is having trouble with cough assist. Based upon her appropriate responses to questions we do feel she is competent to make decisions for her care; that said, however, it is possible that some of the difficulty complying with cough assist is due to difficulty complying with instructions, in addition to difficulty with facial weakness and coordination of respiratory musculature. She continues to have difficulty with secretions, although these sound mucinous and not like saliva. Will therefore stop glycopyrrolate as this could dry out these secretions, causing more issues. If drooling comes back then she can restart glycopyrrolate at a lower dose (1 tab BID). We also discussed options with RT, who recommended redoubling efforts to use cough assist and add oscillation mode. We will also add nebs.  Patient advised to discontinue glycopyrrolate, can restart at lower dose (1mg BID) if drooling worsens.    #Left shoulder pain:  This likely is related to spasticity. Should is not tender on exam and there is minimal concern there is an orthopedic issue going on, however she has the option of going to ortho walk in clinic at [facility] today if she wishes further investigation and this was offered. For spasticity will increase baclofen to 40mg TID and continue botox at higher doses. Will also increase tramadol to 50mg TID.    #Advanced care planning:  Began discussion  of end of life care today. In an extensive conversation she indicated that she would not want to use invasive mechanical ventilation to prolong life in the context of ventilatory failure without the possibility of recovery. Insofar as this was an initial discussion, however, without the option to express herself beyond nodding, we recommended that she and her family discuss this further at home and take sufficient time to be certain. She will be provided an advance directive form and a copy of a POLST so that she can review both.    RETURN TO CLINIC: 3 months    Patient care discussed with attending neurologist, Dr. Allan, who agrees with my assessment and plan.    Gio Lew MD  Neurology PGY3    I personally examined the patient and concur with the resident's note.    Lew Allan M.D.          Answers for HPI/ROS submitted by the patient on 6/20/2019   General Symptoms: No  Skin Symptoms: No  HENT Symptoms: No  EYE SYMPTOMS: No  HEART SYMPTOMS: No  LUNG SYMPTOMS: No  INTESTINAL SYMPTOMS: No  URINARY SYMPTOMS: No  GYNECOLOGIC SYMPTOMS: No  BREAST SYMPTOMS: No  SKELETAL SYMPTOMS: Yes  BLOOD SYMPTOMS: No  NERVOUS SYSTEM SYMPTOMS: Yes  MENTAL HEALTH SYMPTOMS: Yes  Back pain: No  Muscle aches: Yes  Neck pain: No  Swollen joints: No  Joint pain: Yes  Bone pain: No  Muscle cramps: Yes  Muscle weakness: Yes  Joint stiffness: Yes  Bone fracture: No  Trouble with coordination: Yes  Dizziness or trouble with balance: Yes  Fainting or black-out spells: No  Memory loss: No  Headache: No  Seizures: No  Speech problems: Yes  Tingling: No  Tremor: No  Weakness: Yes  Nervous or Anxious: No  Depression: Yes  Trouble sleeping: Yes  Trouble thinking or concentrating: No  Mood changes: Yes  Panic attacks: No

## 2019-06-26 NOTE — TELEPHONE ENCOUNTER
Blanka had pain in both legs and hips during the night which is better today. No swelling, redness, or visible change in the limbs. She was asking David repeatedly to move the legs, so RLS is possible.    I suggested she take 100 mg tramadol tonight and take another 50 mg 4 hours later if needed. Asked David to report response tomorrow. If pain persists I will prescribe gabapentin.

## 2019-06-26 NOTE — TELEPHONE ENCOUNTER
"Call from patient's  requesting increase in pain medication dose and/or advice. Patient up entire night in pain, \"crying and moaning\". Frequent repositioning and stretching has not helped.   requesting call back.  "

## 2019-08-09 NOTE — PROGRESS NOTES
Social Work -ALS Clinic Progress Note  [facility]    Data/Intervention:    Patient Name:  Blanka Morton  /Age:  1960 (59 year old)    Visit Type: telephone    Collaborated With:    -David, Blanka's   -Dr Allan,  home care nurse Cedric and Celine speech    Updates/Concerns:   Celine indicated that  home care is having difficulty getting insurance auth's for home care from [facility]. She also mentioned that David was planning to go to the Soc Sec office to apply for social security disability again for Blanka.    Intervention/Education/Resources Provided:  Called David and reviewed the past conversation about SSDI/SSI. He indicated that they had legal help with the application but it was when she was dx with Progressive Bulbar Palsy. However, it was likely not the dx that was the barrier but the fact that she didn't have enough work credits to get SSDI and couldn't get SSI because David's income was too high.  David indicated he rec'd a call from [facility] indicating that Blanka needs to get a [place] insurance policy.  We discussed that and reviewed the criteria for [facility] which she is likely eligible for. Discussed that if he can go online to [employer] and set up a user name/password, he could contact Tyba and they would help him thru the application. Or, he can do the application on his own. He will review that after he receives my email with the link to the [employer] website. He is aware there is a premium for [place] [facility]. In addition, I contacted  home care  who indicated the [facility] covers home care and hospice.  Discussed the POLST document with David that Dr Allan reviewed with them and they took home to discuss. He indicated that Blanka doesn't want extraordinary measures to prolong her life. I discussed the purpose of the POLST at home and will ask their home care nurse Cedric if she can complete the document with the Pt and David and send to me for signing by Dr Allan.    And lastly, discussed hospice and the services provided. Reviewed that they would want to have the PT/OT/SP services compete before enrolling but Dr Allan indicated that they could enroll anytime they are ready. David didn't have any questions or comments.    Assessment/Plan:  David indicated that the most difficult part is trying to communicate with Blanka about what she wants/needs. SP has set up communication device but it's been a challenge.   David plans to get started on the [employer] application and encouraged him to contact me with any questions or problems. Anticipate transition to hospice after Pt has new insurance thru [employer].     Provided patient/family with contact information and encouraged them to contact me between clinic visits as needed.     Blanka Armas, SURINDER, [facility]    [facility]  [phone]

## 2019-08-12 NOTE — TELEPHONE ENCOUNTER
Rx Authorization:    Requested Medication/ Dose 50mg    Date last refill ordered: 5/1/19    Quantity ordered: 60 tabs    # refills: 3    Date of last clinic visit with ordering provider: 6/20/19    Date of next clinic visit with ordering provider: 9/19/19    All pertinent protocol data (lab date/result):     Include pertinent information from patients message:

## 2019-08-12 NOTE — TELEPHONE ENCOUNTER
Health Call Center    Phone Message    May a detailed message be left on voicemail: no    Reason for Call: Order(s): Other:   Reason for requested: Extension Tubes  Date needed: Asap  Provider name: Dr. Allan  Comments: Please fax to [email or web address] at fax # [phone].      Action Taken: Message routed to:  [facility] ([facility]): [facility] ADULT [facility]

## 2019-08-19 NOTE — TELEPHONE ENCOUNTER
Rx Authorization:    Requested Medication/ Dose gabapentin (NEURONTIN) 250 MG/5ML solution    Date last refill ordered: 06/27/19    Quantity ordered: 150mL    # refills: 1    Date of last clinic visit with ordering provider: 06/20/19    Date of next clinic visit with ordering provider: 09/19/19    All pertinent protocol data (lab date/result):     Include pertinent information from patients message:

## 2019-08-21 NOTE — TELEPHONE ENCOUNTER
Insurance is calling to speak with [facility] regarding which G-tube Blanka has been given.  No triage Necessary.

## 2019-10-31 NOTE — PROGRESS NOTES
CLINICAL NUTRITION SERVICES - REASSESSMENT NOTE   EVALUATION OF PREVIOUS PLAN OF CARE:   Referring Physician: Jerrell  Current diet: bites of pudding, sherbet only  PEG Tube: dependent    Monitoring from previous assessment:   -EN intake/tolerance - Blanka has been tolerating her feeding regimen very well.  BM wnl.  Weight stable  Current EN regimen:  Formula: Isosource 1.5 cl  Volume: 4 cartons/day = 1000mL, 760mL free water, 1500kcal (27kcal/kg), 68g protein (1.2g/kg), 15g fiber daily  Feeding type: gravity/bolus feedings (am 1 1/2 cartons; noon 1 carton; pm 1 1/2 cartons)  Fluids: 90mL water before and after each feeding + 120mL with medications HS.   EN fluids = 1420mL (not including PO fluids)  PO fluids: drinking water, 7-up and ice green tea (1-2 cups/day)  -Weight trends - stable   Wt Readings from Last 10 Encounters:   10/31/19 55.3 kg (122 lb)   06/20/19 55 kg (121 lb 3.2 oz)   06/07/19 53.1 kg (117 lb)   05/22/19 54.4 kg (120 lb)   04/30/19 54 kg (119 lb)   04/11/19 54.3 kg (119 lb 12.8 oz)     Previous Goals:   1.  EN & PEG to provide 100% of estimated nutrition needs and hydration needs  2. Weight maintenance  Evaluation: Met   Previous Nutrition Diagnosis:   Inadequate oral intake related to dysphagia as evidenced by pt dependent on EN via PEG tube to meet 100% of estimated nutrition needs.   Evaluation: No change   CURRENT NUTRITION DIAGNOSIS   Inadequate oral intake related to dysphagia as evidenced by pt dependent on EN via PEG tube to meet 100% of estimated nutrition needs.   INTERVENTIONS   EN Composition, EN Schedule and Feeding Tube Flush - reviewed current regimen - will remain the same as she has been tolerating well and maintaining weight.   Goals  1.  EN & PEG to provide 100% of estimated nutrition needs and hydration needs  2. Weight maintenance (122 lbs)        Follow-Up Plans: Pt has RD contact information for questions.       MONITORING AND EVALUATION:  -EN intake  -Weight trends     Staci  JESSE Dominique RDN, LD

## 2019-10-31 NOTE — PROGRESS NOTES
[facility]  Neuromuscular Progress Note    Patient Name:  Blanka Morton  MRN:  [id number]    :  1960  Date of Service:  2019  Primary care provider:  No Ref-Primary, Physician      Brief Summary:   59 year old female with h/o [id number] ALS who presents for follow-up. PEG placed 2019    Subjective:   ***    Cough: Cough assist ***  Breathing: ***  Sleep: ***  Dysarthria: ***  Dysphagia: ***  Sialorrhea: stopped glycopyrrolate at last visit. ***  Weight: ***  Mobility: ***  Spasticity: ***  Pain/Cramps: tramadol for L shoulder pain***  ADLs: ***  Depression: ***  Anxiety: ***  Pseudobulbar: ***  Cognitive: ***  Life Planning: VERIFY POLST, DNI***    ROS: A 10-point ROS was performed as per HPI.    PMH:  No past medical history on file.  Past Surgical History:   Procedure Laterality Date     IR GASTROSTOMY TUBE PERCUTANEOUS PLCMNT  2019       Medications:      Current Outpatient Medications:      acetaminophen (TYLENOL) 325 MG tablet, Take 1-2 tablets (325-650 mg) by mouth nightly as needed for mild pain, Disp: 30 tablet, Rfl: 0     albuterol (ACCUNEB) 0.63 MG/3ML neb solution, Take 3 mLs (0.63 mg) by nebulization 3 times daily, Disp: 90 vial, Rfl: 3     artificial tears OINT ophthalmic ointment, Place 1 g into both eyes At Bedtime, Disp: 1 Tube, Rfl: 3     baclofen (LIORESAL) 20 MG tablet, 2 tablets (40 mg) by Per G Tube route 3 times daily, Disp: 180 tablet, Rfl: 3     dextromethorphan-quiNIDine (NUEDEXTA) 20-10 MG capsule, 1 capsule by Oral or Feeding Tube route every 12 hours, Disp: 30 capsule, Rfl: 1     escitalopram (LEXAPRO) 10 MG tablet, 1 tablet (10 mg) by Oral or Feeding Tube route daily, Disp: 30 tablet, Rfl: 1     gabapentin (NEURONTIN) 250 MG/5ML solution,  TAKE  5 ML BY NG TUBE AT BEDTIME, Disp: 150 mL, Rfl: 3     glycopyrrolate (ROBINUL) 1 MG tablet, 2 tablets (2 mg) by Oral or Feeding Tube route as needed, Disp: 30 tablet, Rfl: 1     losartan  (COZAAR) 50 MG tablet, 1 tablet (50 mg) by Oral or Feeding Tube route daily, Disp: 30 tablet, Rfl: 1     multivitamins w/minerals (CERTAVITE) liquid, 15 mLs by Per Feeding Tube route daily, Disp: 1 Bottle, Rfl: 3     order for DME, Gravity Tube Feeding & Supplies  Nutren 1.5, 4 cans per day Gravity feeding bags 60cc cath tip syringes IV pole  Diagnosis:  Oropharyngeal dysphagia; ALS, Disp: 1 Month, Rfl: 11     riluzole (RILUTEK) 50 MG tablet, TAKE 1 TABLET BY MOUTH EVERY 12 HOURS, Disp: 60 tablet, Rfl: 3     riluzole (RILUTEK) 50 MG tablet, 1 tablet (50 mg) by Oral or Feeding Tube route every 12 hours, Disp: 60 tablet, Rfl: 3     traMADol (ULTRAM) 50 MG tablet, Take 50 mg in the afternoon and 100 mg at HS. May repeat 50 mg 4 hours after HS dose and another 50 mg 1 hour later if pain persists., Disp: 150 tablet, Rfl: 3    Current Facility-Administered Medications:      botulinum toxin type A (BOTOX) 100 units injection 600 Units, 600 Units, Intramuscular, Q90 Days, Kayden Dominique MD    Physical Examination:   There were no vitals taken for this visit.  Wt Readings from Last 4 Encounters:   06/20/19 55 kg (121 lb 3.2 oz)   06/07/19 53.1 kg (117 lb)   05/22/19 54.4 kg (120 lb)   04/30/19 54 kg (119 lb)     General: pt sitting comfortably in chair, breathing easily on ra, in NAD ***  -MS: alert, speech fluent and coherent    Neurological Exam:   ***    Investigations:    ***    FVC-Pred   Date Value Ref Range Status   06/20/2019 3.11 L    04/11/2019 3.13 L      FVC-Pre   Date Value Ref Range Status   06/20/2019 0.83 L    04/11/2019 1.69 L      FVC-%Pred-Pre   Date Value Ref Range Status   06/20/2019 26 %    04/11/2019 54 %      FEV1-Pre   Date Value Ref Range Status   06/20/2019 0.83 L    04/11/2019 1.58 L      FEV1-%Pred-Pre   Date Value Ref Range Status   06/20/2019 33 %    04/11/2019 63 %      FEV1FVC-Pred   Date Value Ref Range Status   06/20/2019 80 %    04/11/2019 81 %      FEV1FVC-Pre   Date Value Ref  Range Status   06/20/2019 100 %    04/11/2019 93 %      No results found for: 20029  FEFMax-Pred   Date Value Ref Range Status   06/20/2019 6.60 L/sec    04/11/2019 6.65 L/sec      FEFMax-Pre   Date Value Ref Range Status   06/20/2019 1.41 L/sec    04/11/2019 3.21 L/sec      FEFMax-%Pred-Pre   Date Value Ref Range Status   06/20/2019 21 %    04/11/2019 48 %      ExpTime-Pre   Date Value Ref Range Status   06/20/2019 1.08 sec    04/11/2019 3.50 sec      FIFMax-Pre   Date Value Ref Range Status   06/20/2019 1.60 L/sec    04/11/2019 2.05 L/sec      FEV1FEV6-Pred   Date Value Ref Range Status   06/20/2019 81 %    04/11/2019 81 %      FEV1FEV6-Pre   Date Value Ref Range Status   06/20/2019 100 %    04/11/2019 94 %      No results found for: 20055    Impression:  59 year old female with h/o *** who presents for ALS follow-up.    #ALS: ***    Cough: ***  Breathing: ***  Sleep: ***  Dysarthria: ***  Dysphagia: ***  Sialorrhea: ***  Mobility: ***  Spasticity: ***  Pain: ***  ADLs: ***  Anxiety: ***  Depression: ***  Pseudobulbar: ***  Cognitive: ***  Life Planning: ***  Research: ***    Recommendations:   -***  -RTC 3 mo    Patient seen and discussed with attending neurologist,  ***, who agrees with above.    Capo Chavira MD  Neuromuscular Fellow, PGY-5  [phone]

## 2019-10-31 NOTE — PROGRESS NOTES
Social Work -ALS Clinic Progress Note  [facility]    Data/Intervention:    Patient Name:  Blanka Morton  /Age:  1960 (59 year old)    Visit Type: in person    Collaborated With:    -Blanka and spouse David  -Dr Allan and members of the ALS interdisciplinary team    Updates/Concerns:   Dr Allan discussed hospice and POLST with Blanka and David. They are both in agreement with a referral to hospice. Blanka is very difficult to communicate with and david indicated it's often a big source of frustration for both of them.   They have visits from their local family. They both have daugthers in the [facility] area.     Intervention/Education/Resources Provided:  Reviewed hospice services and facilitated the completion of the POLST. It was sent to HIM for scanning and copies made for Pt.  They would [facility] to use [facility] as they have had [facility] care in the past.     Assessment/Plan:  Blanka is very difficult to communicate with. David can usually understand her head movements to indicate her yes or no answers. I think he will appreciate the support from hospice. He needs help and support.     Provided patient/family with contact information and encouraged them to contact me between clinic visits as needed.     Blanka Armas, SURINDER, [facility]    [facility]  [phone]

## 2019-10-31 NOTE — LETTER
10/31/2019       RE: Ruma Morton  [address]  [address]  [place] [address]     Dear Colleague,    Thank you for referring your patient, Ruma Morton, to the [facility] NEUROLOGY at [facility]. Please see a copy of my visit note below.    [id number]    Service Date: 10/31/2019      Ruma Morton returned to the [facility] today for followup of her ALS.  Ruma has not been seen here since .  She is living with her  and fully dependent in ADLs.  She is nonverbal and although she has been provided a DynaVox reportedly communicates only by shaking or turning her head to the left for no and to the right for yes.      On examination, I find an alert, nonverbal individual.  There are minimal vocalizations and she does turn her head appropriately as noted above.  There is no functional use of the limbs and contractures are noted in both hands.  Her gastrostomy tube is intact and is reportedly functioning well.      Ruma will need to meet with all members of our multidisciplinary care team today.  Her  indicates that they have completed an advanced directive, but he neglected to bring it in today.  I discussed her goals of care, using yes/no questions, and she reaffirmed her interest in comfort care, which is consistent with her prior expressed wishes.       Lew Allan MD      D: 10/31/2019   T: 10/31/2019   MT: BHAVNA      Name:     RUMA MORTON   MRN:      [id number]        Account:      [id number]   :      1960           Service Date: 10/31/2019      Document: [id number]

## 2019-10-31 NOTE — NURSING NOTE
Chief Complaint   Patient presents with     Als     [facility] RETURN ALS/MOTOR NEURON        Sixto Dickson, EMT

## 2019-10-31 NOTE — PROGRESS NOTES
Service Date: 10/31/2019      Ruma Morton returned to the [facility] today for followup of her ALS.  Ruma has not been seen here since .  She is living with her  and fully dependent in ADLs.  She is nonverbal and although she has been provided a DynaVox reportedly communicates only by shaking or turning her head to the left for no and to the right for yes.      On examination, I find an alert, nonverbal individual.  There are minimal vocalizations and she does turn her head appropriately as noted above.  There is no functional use of the limbs and contractures are noted in both hands.  Her gastrostomy tube is intact and is reportedly functioning well.      Ruma will need to meet with all members of our multidisciplinary care team today.  Her  indicates that they have completed an advanced directive, but he neglected to bring it in today.  I discussed her goals of care, using yes/no questions, and she reaffirmed her interest in comfort care, which is consistent with her prior expressed wishes.       MD SYDNI Lovell MD             D: 10/31/2019   T: 10/31/2019   MT: BHAVNA      Name:     RUMA MORTON   MRN:      [id number]        Account:      [id number]   :      1960           Service Date: 10/31/2019      Document: [id number]

## 2019-10-31 NOTE — PROGRESS NOTES
"    OUTPATIENT PHYSICAL THERAPY CLINIC NOTE  Blanka Morton     YOB: 1960  [id number]    Type of visit:         Evaluation     Date of service: 10/31/2019    Referring provider: Dr. Allan     present: No    Others present at visit:  Spouse / significant otherDavid    Medical diagnosis:   Amyotrophic lateral sclerosis (ALS)    Date of diagnosis: 2018 in [place]    Pertinent history of current problem (include personal factors and/or comorbidities that impact the plan of care): Onset 6/2017 of slow speech and dysphagia. Oct 2018 onset of limb weakness, L LE and L hand, then progressing to R hand. Nov 2018 progression to anarthria. Pt has C9orf mutation. Pt has h/o seizures, HTN, low back pain, depression and anxiety. Family hx of ALS- mother, 2 brothers, and an aunt.    Cardio-respiratory status:  Forced vital capacity: NT this date dt difficulty. 26% of predicted on 6/20/19     Height/Weight: 5'5\" / 122 lb    Living environment:  [place]", with spouse  [place]     Living environment barriers:  Elevator access at [facility]  Looking for accessible housing in area.                Current assistance/living environment:  Lives with spouse, David                            Current mobility equipment:  Manual WC with foam cushion  *Tilt in space manual- returned d/t poor comfort (vs PWC- loaner returned, pt/spouse preference)  Milan lift requested today- spouse notes they may wait until moved  Roho to be requested today  Transfer belt requested today                Current ADL equipment:  Bathing in bed (recliner) & by sink with spouse assist  Shower has lip, unable to access via rolling chair or milan  Using diapers vs transfering to toilet  Wheeled shower commode chair requested- but returned  Resting hand splints- doesn't like so rarely used     Technology used: Has Tobii dynavox- limited use thus far    Patient concerns/goals: Returned PWC bc she didn't like it. They sent out a Tilt " in space manual WC with headrest, but the arm rests were too high (troughs) and she didn't like it, returned it without replacement (per SLP they attempted to send out another but pt/spouse declined another).     Evaluation   Interview completed.   Pain assessment:  Pain present, in back    Range of motion: Knee and ankle contractures with spasticity B LEs. Pt spouse reports not doing ROM d/t pt not liking it and not wanting to upset her   Manual muscle testing: No functional movement of LEs   Gait: Non-ambulatory   Cognition: Limited communication d/t not having Tobii device in clinic. Pt turns her head to indicate yes/no and appears consistent.    Fall Risk Screen:   Has the patient fallen 2 or more times in the last year? No      Has the patient fallen and had an injury in the past year? No       Timed Up and Go Score: Not able to perform due to non-ambulatory    Is the patient a fall risk? Yes, department fall risk interventions implemented     Impairments:  Fatigue  Muscle atrophy  Coordination  Balance  Cognition  Pain  Range of motion  Spasticity     Treatment diagnosis:  Impaired mobility  Impaired activities of daily living    Clinical Presentation: Evolving/Changing  Clinical Presentation Rationale: Progressive nature of disease, now in non-ambulatory phase and dependent with ADLs.  Clinical Decision Making (Complexity): Moderate complexity     Recommendations/Plan of care:  1 session evaluation & treatment.  Recommend referral to Home PT/OT/SLP- to address equipment needs and caregiver instruction for positioning, transfers, ADLs, or potentially Hospice per MD recommendation.  Equipment recommended: Tilt-in-space manual wheelchair- will address replacement from [facility] [employer] pool with [facility] staff.     Goals:   Target date: 10/31/19  Patient, family and/or caregiver will verbalize understanding of evaluation results and implications for functional performance.  Patient, family and/or caregiver will  verbalize/demonstrate understanding of compensatory methods /equipment to enhance functional independence and safety.    Educational assessment/barriers to learning:   No barriers noted. See OT/SLP notes for prior cognitive screening.     Treatment provided this date:   Self-care/Home management, 14 minutes   -Encouraged daily stretching/ROM to help manage spasticity and prevent contractures and aide positioning and comfort. Pt spouse notes they did learn ROM but she doesn't like it so he doesn't want to upset her. Encouraged slow gentle movements in comfortable position. -Encouraged use of the hand splints she does have at home. Doesn't need to be long extended time like all night, could alternate hands and do short times initially. A little intermittent time is better than no use. Note hands with dry peeling skin, discussed good hygiene and having palms open to avoid potential infection issues. Will have home OT also address ADL/hygiene needs at home.   -Pt spouse reports they returned the tilt-in-space manual WC d/t arm rests too high and seat too narrow, pt not comfortable. He notes they didn't bring a replacement. Per SLP involved in her prior home care, they had declined receiving a replacement chair. I will follow up with ALS to provide a replacement. Pt spends most of her time in a recliner at home, most comfortable for her.  -Pt with head dipped forward and to the right in basic manual WC today (no recline/tilt). Pt spouse notes she has a more forward head than he's seen, she's up in this chair about a 1/2 hour a day. Education for role of cervical collar for head support, with Headmaster collar picture shown. Pt notes not interested in this. Usually does well with reclined position with head support.    Response to treatment/recommendations: Pt and  communicate understanding, pt nods.    Goal attainment:  All goals met     Risks and benefits of evaluation/treatment have been explained.  Patient,  family and/or caregiver are in agreement with Plan of Care.     Timed Code Treatment Minutes: 14  Total Treatment Time (sum of timed and untimed services): 21    Signature: Franny Kenyon, PT   Date: 10/31/2019

## 2019-12-06 NOTE — PROGRESS NOTES
This is a recent snapshot of the patient's [facility] medical record.  For current drug dose and complete information and questions, call [phone] or In Basket pool, [facility] ([id number])  CSN Number:  [id number]

## 2024-06-06 NOTE — PROGRESS NOTES
Call from Home Care nurse requesting adjustment in patient's pain medication. Spoke with  who indicated patient does seem to be sleeping better at night but complains of intense pain during the afternoon. Nurse and  are requesting a dose of Tramadol be allowed in afternoon.   independent

## (undated) RX ORDER — SODIUM CHLORIDE 9 MG/ML
INJECTION, SOLUTION INTRAVENOUS
Status: DISPENSED
Start: 2019-01-01

## (undated) RX ORDER — LIDOCAINE HYDROCHLORIDE 10 MG/ML
INJECTION, SOLUTION EPIDURAL; INFILTRATION; INTRACAUDAL; PERINEURAL
Status: DISPENSED
Start: 2019-01-01

## (undated) RX ORDER — LIDOCAINE HYDROCHLORIDE 20 MG/ML
JELLY TOPICAL
Status: DISPENSED
Start: 2019-01-01

## (undated) RX ORDER — FENTANYL CITRATE 50 UG/ML
INJECTION, SOLUTION INTRAMUSCULAR; INTRAVENOUS
Status: DISPENSED
Start: 2019-01-01